# Patient Record
Sex: FEMALE | Race: BLACK OR AFRICAN AMERICAN | NOT HISPANIC OR LATINO | URBAN - METROPOLITAN AREA
[De-identification: names, ages, dates, MRNs, and addresses within clinical notes are randomized per-mention and may not be internally consistent; named-entity substitution may affect disease eponyms.]

---

## 2017-02-04 ENCOUNTER — INPATIENT (INPATIENT)
Facility: HOSPITAL | Age: 62
LOS: 0 days | Discharge: HOME | End: 2017-02-04
Attending: EMERGENCY MEDICINE | Admitting: FAMILY MEDICINE

## 2017-05-05 ENCOUNTER — OUTPATIENT (OUTPATIENT)
Dept: OUTPATIENT SERVICES | Facility: HOSPITAL | Age: 62
LOS: 1 days | Discharge: HOME | End: 2017-05-05

## 2017-05-10 ENCOUNTER — RECORD ABSTRACTING (OUTPATIENT)
Age: 62
End: 2017-05-10

## 2017-05-10 DIAGNOSIS — Z83.3 FAMILY HISTORY OF DIABETES MELLITUS: ICD-10-CM

## 2017-05-10 DIAGNOSIS — L98.0 PYOGENIC GRANULOMA: ICD-10-CM

## 2017-05-10 DIAGNOSIS — I10 ESSENTIAL (PRIMARY) HYPERTENSION: ICD-10-CM

## 2017-05-10 DIAGNOSIS — Z80.9 FAMILY HISTORY OF MALIGNANT NEOPLASM, UNSPECIFIED: ICD-10-CM

## 2017-05-10 DIAGNOSIS — E11.9 TYPE 2 DIABETES MELLITUS W/OUT COMPLICATIONS: ICD-10-CM

## 2017-05-10 DIAGNOSIS — Z78.9 OTHER SPECIFIED HEALTH STATUS: ICD-10-CM

## 2017-05-10 DIAGNOSIS — E78.00 PURE HYPERCHOLESTEROLEMIA, UNSPECIFIED: ICD-10-CM

## 2017-05-10 DIAGNOSIS — Z98.890 OTHER SPECIFIED POSTPROCEDURAL STATES: ICD-10-CM

## 2017-05-10 DIAGNOSIS — M77.9 ENTHESOPATHY, UNSPECIFIED: ICD-10-CM

## 2017-05-10 DIAGNOSIS — Z82.49 FAMILY HISTORY OF ISCHEMIC HEART DISEASE AND OTHER DISEASES OF THE CIRCULATORY SYSTEM: ICD-10-CM

## 2017-05-10 RX ORDER — METFORMIN HYDROCHLORIDE 1000 MG/1
1000 TABLET, FILM COATED ORAL
Refills: 0 | Status: ACTIVE | COMMUNITY

## 2017-05-10 RX ORDER — ATORVASTATIN CALCIUM 10 MG/1
10 TABLET, FILM COATED ORAL
Refills: 0 | Status: ACTIVE | COMMUNITY

## 2017-05-10 RX ORDER — HYDROCHLOROTHIAZIDE 25 MG/1
25 TABLET ORAL
Refills: 0 | Status: ACTIVE | COMMUNITY

## 2017-05-10 RX ORDER — LOSARTAN POTASSIUM AND HYDROCHLOROTHIAZIDE 12.5; 1 MG/1; MG/1
100-12.5 TABLET ORAL
Refills: 0 | Status: ACTIVE | COMMUNITY

## 2017-06-08 ENCOUNTER — APPOINTMENT (OUTPATIENT)
Dept: PLASTIC SURGERY | Facility: CLINIC | Age: 62
End: 2017-06-08

## 2017-06-08 VITALS — BODY MASS INDEX: 26.36 KG/M2 | HEIGHT: 66 IN | WEIGHT: 164 LBS

## 2017-06-08 DIAGNOSIS — Z87.39 PERSONAL HISTORY OF OTHER DISEASES OF THE MUSCULOSKELETAL SYSTEM AND CONNECTIVE TISSUE: ICD-10-CM

## 2017-06-08 RX ORDER — ATENOLOL 25 MG/1
25 TABLET ORAL
Refills: 0 | Status: DISCONTINUED | COMMUNITY
End: 2017-06-08

## 2017-06-08 RX ORDER — ASPIRIN 81 MG
81 TABLET, DELAYED RELEASE (ENTERIC COATED) ORAL
Refills: 0 | Status: DISCONTINUED | COMMUNITY
End: 2017-06-08

## 2017-06-08 RX ORDER — ATENOLOL 50 MG/1
50 TABLET ORAL DAILY
Refills: 0 | Status: ACTIVE | COMMUNITY

## 2017-06-28 DIAGNOSIS — M54.2 CERVICALGIA: ICD-10-CM

## 2017-07-24 DIAGNOSIS — E11.9 TYPE 2 DIABETES MELLITUS WITHOUT COMPLICATIONS: ICD-10-CM

## 2017-07-24 DIAGNOSIS — R07.9 CHEST PAIN, UNSPECIFIED: ICD-10-CM

## 2017-07-24 DIAGNOSIS — R07.89 OTHER CHEST PAIN: ICD-10-CM

## 2017-07-24 DIAGNOSIS — M54.2 CERVICALGIA: ICD-10-CM

## 2017-07-24 DIAGNOSIS — M25.512 PAIN IN LEFT SHOULDER: ICD-10-CM

## 2017-07-24 DIAGNOSIS — E78.5 HYPERLIPIDEMIA, UNSPECIFIED: ICD-10-CM

## 2017-07-24 DIAGNOSIS — I10 ESSENTIAL (PRIMARY) HYPERTENSION: ICD-10-CM

## 2017-07-29 ENCOUNTER — OUTPATIENT (OUTPATIENT)
Dept: OUTPATIENT SERVICES | Facility: HOSPITAL | Age: 62
LOS: 1 days | Discharge: HOME | End: 2017-07-29

## 2017-07-29 DIAGNOSIS — R79.89 OTHER SPECIFIED ABNORMAL FINDINGS OF BLOOD CHEMISTRY: ICD-10-CM

## 2017-07-29 DIAGNOSIS — M06.4 INFLAMMATORY POLYARTHROPATHY: ICD-10-CM

## 2017-07-29 DIAGNOSIS — R07.9 CHEST PAIN, UNSPECIFIED: ICD-10-CM

## 2017-07-29 DIAGNOSIS — D50.8 OTHER IRON DEFICIENCY ANEMIAS: ICD-10-CM

## 2017-07-29 DIAGNOSIS — E11.9 TYPE 2 DIABETES MELLITUS WITHOUT COMPLICATIONS: ICD-10-CM

## 2017-10-05 ENCOUNTER — OUTPATIENT (OUTPATIENT)
Dept: OUTPATIENT SERVICES | Facility: HOSPITAL | Age: 62
LOS: 1 days | Discharge: HOME | End: 2017-10-05

## 2017-10-05 DIAGNOSIS — D50.8 OTHER IRON DEFICIENCY ANEMIAS: ICD-10-CM

## 2017-10-05 DIAGNOSIS — E11.9 TYPE 2 DIABETES MELLITUS WITHOUT COMPLICATIONS: ICD-10-CM

## 2017-10-05 DIAGNOSIS — R07.9 CHEST PAIN, UNSPECIFIED: ICD-10-CM

## 2017-10-05 DIAGNOSIS — R79.89 OTHER SPECIFIED ABNORMAL FINDINGS OF BLOOD CHEMISTRY: ICD-10-CM

## 2017-12-22 ENCOUNTER — OUTPATIENT (OUTPATIENT)
Dept: OUTPATIENT SERVICES | Facility: HOSPITAL | Age: 62
LOS: 1 days | Discharge: HOME | End: 2017-12-22

## 2017-12-22 DIAGNOSIS — E11.9 TYPE 2 DIABETES MELLITUS WITHOUT COMPLICATIONS: ICD-10-CM

## 2017-12-22 DIAGNOSIS — R07.9 CHEST PAIN, UNSPECIFIED: ICD-10-CM

## 2017-12-22 DIAGNOSIS — D50.8 OTHER IRON DEFICIENCY ANEMIAS: ICD-10-CM

## 2018-03-07 ENCOUNTER — APPOINTMENT (OUTPATIENT)
Dept: PLASTIC SURGERY | Facility: CLINIC | Age: 63
End: 2018-03-07

## 2018-03-29 ENCOUNTER — OUTPATIENT (OUTPATIENT)
Dept: OUTPATIENT SERVICES | Facility: HOSPITAL | Age: 63
LOS: 1 days | Discharge: HOME | End: 2018-03-29

## 2018-03-29 DIAGNOSIS — M06.4 INFLAMMATORY POLYARTHROPATHY: ICD-10-CM

## 2018-06-12 ENCOUNTER — APPOINTMENT (OUTPATIENT)
Dept: PLASTIC SURGERY | Facility: CLINIC | Age: 63
End: 2018-06-12
Payer: MEDICAID

## 2018-06-12 PROCEDURE — 20550 NJX 1 TENDON SHEATH/LIGAMENT: CPT

## 2018-06-12 PROCEDURE — 99212 OFFICE O/P EST SF 10 MIN: CPT | Mod: 25

## 2018-06-12 RX ORDER — ASPIRIN 81 MG
81 TABLET, DELAYED RELEASE (ENTERIC COATED) ORAL
Refills: 0 | Status: ACTIVE | COMMUNITY

## 2018-06-15 ENCOUNTER — OUTPATIENT (OUTPATIENT)
Dept: OUTPATIENT SERVICES | Facility: HOSPITAL | Age: 63
LOS: 1 days | Discharge: HOME | End: 2018-06-15

## 2018-06-15 DIAGNOSIS — I10 ESSENTIAL (PRIMARY) HYPERTENSION: ICD-10-CM

## 2018-06-15 DIAGNOSIS — E78.2 MIXED HYPERLIPIDEMIA: ICD-10-CM

## 2018-06-29 ENCOUNTER — OUTPATIENT (OUTPATIENT)
Dept: OUTPATIENT SERVICES | Facility: HOSPITAL | Age: 63
LOS: 1 days | Discharge: HOME | End: 2018-06-29

## 2018-06-29 DIAGNOSIS — D64.9 ANEMIA, UNSPECIFIED: ICD-10-CM

## 2018-06-29 DIAGNOSIS — E11.9 TYPE 2 DIABETES MELLITUS WITHOUT COMPLICATIONS: ICD-10-CM

## 2018-08-10 ENCOUNTER — APPOINTMENT (OUTPATIENT)
Dept: PLASTIC SURGERY | Facility: CLINIC | Age: 63
End: 2018-08-10
Payer: MEDICARE

## 2018-08-10 PROCEDURE — 26055 INCISE FINGER TENDON SHEATH: CPT | Mod: F6

## 2018-08-17 ENCOUNTER — APPOINTMENT (OUTPATIENT)
Dept: PLASTIC SURGERY | Facility: CLINIC | Age: 63
End: 2018-08-17
Payer: MEDICARE

## 2018-08-17 PROCEDURE — 99024 POSTOP FOLLOW-UP VISIT: CPT

## 2018-08-24 ENCOUNTER — APPOINTMENT (OUTPATIENT)
Dept: PLASTIC SURGERY | Facility: CLINIC | Age: 63
End: 2018-08-24
Payer: MEDICARE

## 2018-08-24 PROCEDURE — 99024 POSTOP FOLLOW-UP VISIT: CPT

## 2018-08-29 ENCOUNTER — APPOINTMENT (OUTPATIENT)
Dept: PLASTIC SURGERY | Facility: CLINIC | Age: 63
End: 2018-08-29
Payer: MEDICARE

## 2018-08-29 PROCEDURE — 99024 POSTOP FOLLOW-UP VISIT: CPT

## 2018-08-29 RX ORDER — PREDNISONE 1 MG/1
1 TABLET ORAL
Qty: 120 | Refills: 0 | Status: COMPLETED | COMMUNITY
Start: 2018-04-12 | End: 2018-07-13

## 2018-08-29 RX ORDER — BLOOD SUGAR DIAGNOSTIC
STRIP MISCELLANEOUS
Qty: 100 | Refills: 0 | Status: ACTIVE | COMMUNITY
Start: 2018-05-21

## 2018-08-29 RX ORDER — ATORVASTATIN CALCIUM 20 MG/1
20 TABLET, FILM COATED ORAL
Qty: 30 | Refills: 0 | Status: ACTIVE | COMMUNITY
Start: 2018-02-28

## 2018-08-29 RX ORDER — BLOOD-GLUCOSE METER
W/DEVICE EACH MISCELLANEOUS
Qty: 1 | Refills: 0 | Status: ACTIVE | COMMUNITY
Start: 2018-05-21

## 2018-08-29 RX ORDER — LOSARTAN POTASSIUM 100 MG/1
100 TABLET, FILM COATED ORAL
Qty: 90 | Refills: 0 | Status: ACTIVE | COMMUNITY
Start: 2018-07-02

## 2018-08-29 RX ORDER — AMLODIPINE BESYLATE 5 MG/1
5 TABLET ORAL
Qty: 30 | Refills: 0 | Status: ACTIVE | COMMUNITY
Start: 2018-02-28

## 2018-09-07 ENCOUNTER — APPOINTMENT (OUTPATIENT)
Dept: PLASTIC SURGERY | Facility: CLINIC | Age: 63
End: 2018-09-07

## 2018-09-18 ENCOUNTER — OUTPATIENT (OUTPATIENT)
Dept: OUTPATIENT SERVICES | Facility: HOSPITAL | Age: 63
LOS: 1 days | Discharge: HOME | End: 2018-09-18

## 2018-09-18 DIAGNOSIS — M65.331 TRIGGER FINGER, RIGHT MIDDLE FINGER: ICD-10-CM

## 2018-09-27 ENCOUNTER — APPOINTMENT (OUTPATIENT)
Dept: PLASTIC SURGERY | Facility: CLINIC | Age: 63
End: 2018-09-27
Payer: MEDICARE

## 2018-09-27 PROCEDURE — 99024 POSTOP FOLLOW-UP VISIT: CPT

## 2018-09-28 ENCOUNTER — OUTPATIENT (OUTPATIENT)
Dept: OUTPATIENT SERVICES | Facility: HOSPITAL | Age: 63
LOS: 1 days | Discharge: HOME | End: 2018-09-28

## 2018-09-28 DIAGNOSIS — K76.9 LIVER DISEASE, UNSPECIFIED: ICD-10-CM

## 2018-09-28 DIAGNOSIS — M06.4 INFLAMMATORY POLYARTHROPATHY: ICD-10-CM

## 2018-09-28 DIAGNOSIS — E78.2 MIXED HYPERLIPIDEMIA: ICD-10-CM

## 2018-09-28 DIAGNOSIS — M35.00 SJOGREN SYNDROME, UNSPECIFIED: ICD-10-CM

## 2018-09-28 DIAGNOSIS — R05 COUGH: ICD-10-CM

## 2018-09-28 DIAGNOSIS — R53.81 OTHER MALAISE: ICD-10-CM

## 2018-09-28 DIAGNOSIS — D50.8 OTHER IRON DEFICIENCY ANEMIAS: ICD-10-CM

## 2018-11-13 ENCOUNTER — OUTPATIENT (OUTPATIENT)
Dept: OUTPATIENT SERVICES | Facility: HOSPITAL | Age: 63
LOS: 1 days | Discharge: HOME | End: 2018-11-13

## 2018-11-13 DIAGNOSIS — E78.2 MIXED HYPERLIPIDEMIA: ICD-10-CM

## 2018-11-13 DIAGNOSIS — D50.8 OTHER IRON DEFICIENCY ANEMIAS: ICD-10-CM

## 2018-11-13 DIAGNOSIS — M06.4 INFLAMMATORY POLYARTHROPATHY: ICD-10-CM

## 2018-12-12 ENCOUNTER — OUTPATIENT (OUTPATIENT)
Dept: OUTPATIENT SERVICES | Facility: HOSPITAL | Age: 63
LOS: 1 days | Discharge: HOME | End: 2018-12-12

## 2018-12-12 DIAGNOSIS — D50.8 OTHER IRON DEFICIENCY ANEMIAS: ICD-10-CM

## 2018-12-12 DIAGNOSIS — M06.4 INFLAMMATORY POLYARTHROPATHY: ICD-10-CM

## 2018-12-12 DIAGNOSIS — E78.2 MIXED HYPERLIPIDEMIA: ICD-10-CM

## 2018-12-18 ENCOUNTER — APPOINTMENT (OUTPATIENT)
Dept: PLASTIC SURGERY | Facility: CLINIC | Age: 63
End: 2018-12-18
Payer: MEDICARE

## 2018-12-18 PROCEDURE — 99212 OFFICE O/P EST SF 10 MIN: CPT

## 2018-12-31 ENCOUNTER — OUTPATIENT (OUTPATIENT)
Dept: OUTPATIENT SERVICES | Facility: HOSPITAL | Age: 63
LOS: 1 days | Discharge: HOME | End: 2018-12-31

## 2018-12-31 DIAGNOSIS — D50.8 OTHER IRON DEFICIENCY ANEMIAS: ICD-10-CM

## 2018-12-31 DIAGNOSIS — M06.4 INFLAMMATORY POLYARTHROPATHY: ICD-10-CM

## 2018-12-31 DIAGNOSIS — E78.2 MIXED HYPERLIPIDEMIA: ICD-10-CM

## 2019-01-21 ENCOUNTER — OUTPATIENT (OUTPATIENT)
Dept: OUTPATIENT SERVICES | Facility: HOSPITAL | Age: 64
LOS: 1 days | Discharge: HOME | End: 2019-01-21

## 2019-01-21 DIAGNOSIS — N39.0 URINARY TRACT INFECTION, SITE NOT SPECIFIED: ICD-10-CM

## 2019-02-14 ENCOUNTER — OUTPATIENT (OUTPATIENT)
Dept: OUTPATIENT SERVICES | Facility: HOSPITAL | Age: 64
LOS: 1 days | Discharge: HOME | End: 2019-02-14

## 2019-02-14 DIAGNOSIS — D50.8 OTHER IRON DEFICIENCY ANEMIAS: ICD-10-CM

## 2019-02-14 DIAGNOSIS — M06.4 INFLAMMATORY POLYARTHROPATHY: ICD-10-CM

## 2019-02-14 DIAGNOSIS — E78.2 MIXED HYPERLIPIDEMIA: ICD-10-CM

## 2019-03-23 ENCOUNTER — OUTPATIENT (OUTPATIENT)
Dept: OUTPATIENT SERVICES | Facility: HOSPITAL | Age: 64
LOS: 1 days | Discharge: HOME | End: 2019-03-23

## 2019-03-23 DIAGNOSIS — D60.8 OTHER ACQUIRED PURE RED CELL APLASIAS: ICD-10-CM

## 2019-03-23 DIAGNOSIS — M06.4 INFLAMMATORY POLYARTHROPATHY: ICD-10-CM

## 2019-03-23 DIAGNOSIS — E78.2 MIXED HYPERLIPIDEMIA: ICD-10-CM

## 2019-03-23 DIAGNOSIS — E55.9 VITAMIN D DEFICIENCY, UNSPECIFIED: ICD-10-CM

## 2019-03-23 DIAGNOSIS — E11.9 TYPE 2 DIABETES MELLITUS WITHOUT COMPLICATIONS: ICD-10-CM

## 2019-03-23 DIAGNOSIS — I10 ESSENTIAL (PRIMARY) HYPERTENSION: ICD-10-CM

## 2019-05-09 ENCOUNTER — OUTPATIENT (OUTPATIENT)
Dept: OUTPATIENT SERVICES | Facility: HOSPITAL | Age: 64
LOS: 1 days | Discharge: HOME | End: 2019-05-09

## 2019-05-09 DIAGNOSIS — M06.4 INFLAMMATORY POLYARTHROPATHY: ICD-10-CM

## 2019-05-09 DIAGNOSIS — E78.2 MIXED HYPERLIPIDEMIA: ICD-10-CM

## 2019-05-09 DIAGNOSIS — D60.8 OTHER ACQUIRED PURE RED CELL APLASIAS: ICD-10-CM

## 2019-06-05 ENCOUNTER — OUTPATIENT (OUTPATIENT)
Dept: OUTPATIENT SERVICES | Facility: HOSPITAL | Age: 64
LOS: 1 days | Discharge: HOME | End: 2019-06-05

## 2019-06-05 DIAGNOSIS — E78.2 MIXED HYPERLIPIDEMIA: ICD-10-CM

## 2019-06-05 DIAGNOSIS — I10 ESSENTIAL (PRIMARY) HYPERTENSION: ICD-10-CM

## 2019-06-05 DIAGNOSIS — E11.9 TYPE 2 DIABETES MELLITUS WITHOUT COMPLICATIONS: ICD-10-CM

## 2019-06-05 DIAGNOSIS — E55.9 VITAMIN D DEFICIENCY, UNSPECIFIED: ICD-10-CM

## 2019-06-10 ENCOUNTER — OUTPATIENT (OUTPATIENT)
Dept: OUTPATIENT SERVICES | Facility: HOSPITAL | Age: 64
LOS: 1 days | Discharge: HOME | End: 2019-06-10

## 2019-06-10 DIAGNOSIS — D50.9 IRON DEFICIENCY ANEMIA, UNSPECIFIED: ICD-10-CM

## 2019-06-10 DIAGNOSIS — M35.3 POLYMYALGIA RHEUMATICA: ICD-10-CM

## 2019-06-17 ENCOUNTER — EMERGENCY (EMERGENCY)
Facility: HOSPITAL | Age: 64
LOS: 0 days | Discharge: HOME | End: 2019-06-17
Attending: EMERGENCY MEDICINE | Admitting: EMERGENCY MEDICINE
Payer: MEDICARE

## 2019-06-17 VITALS
HEART RATE: 73 BPM | TEMPERATURE: 99 F | RESPIRATION RATE: 16 BRPM | SYSTOLIC BLOOD PRESSURE: 183 MMHG | OXYGEN SATURATION: 100 % | DIASTOLIC BLOOD PRESSURE: 87 MMHG

## 2019-06-17 DIAGNOSIS — M54.5 LOW BACK PAIN: ICD-10-CM

## 2019-06-17 DIAGNOSIS — M54.9 DORSALGIA, UNSPECIFIED: ICD-10-CM

## 2019-06-17 DIAGNOSIS — G89.29 OTHER CHRONIC PAIN: ICD-10-CM

## 2019-06-17 PROCEDURE — 99282 EMERGENCY DEPT VISIT SF MDM: CPT

## 2019-06-17 NOTE — ED PROVIDER NOTE - OBJECTIVE STATEMENT
63F nosigPMH p/w right sided lower back pain. Pain present for 1 day, denies b/l weakness or numbness, unilateral weakness or numbness. Back pain does not radiate. Denies fever, chills, saddle anesthesia, urinary or fecal incontinence, abdominal pain, syncope. Pt has had similar pain the past.

## 2019-06-17 NOTE — ED PROVIDER NOTE - PHYSICAL EXAMINATION
Constitutional: Well developed, well nourished. NAD. Good general hygiene  Cardiovascular: Regular rhythm. Regular rate. Normal S1 and S2. No murmurs. 2+ pulses in all extremities.   Pulmonary: Normal respiratory rate and effort. Lungs clear to auscultation bilaterally. No wheezing, rales, or rhonchi. Bilateral, equal lung expansion.   Abdominal: Soft. Nondistended. Nontender. No rebound or guarding.   Back: Right sided point tenderness overlying right SI joint. No midline back tenderness.  Extremities. Pelvis stable. No lower extremity edema. Symmetric calves.  Skin: No rashes.   Neuro: AAOx3. 5/5 strength lower extremities. Sensation equal, intact in b/l LE's. Saddle area sensation intact.

## 2019-06-17 NOTE — ED PROVIDER NOTE - ATTENDING CONTRIBUTION TO CARE
Pt is a 62yo female with 1d of R lower back pain.  Non-radiating.  No trauma.  Recurrent familiar pain.  tried a brown paper soaked in vinegar.    Exam: no spinal tenderness, R SI joint pain, NAD, noraml gait  Imp: SI arthritis  Plan: dc home

## 2019-06-20 ENCOUNTER — OUTPATIENT (OUTPATIENT)
Dept: OUTPATIENT SERVICES | Facility: HOSPITAL | Age: 64
LOS: 1 days | Discharge: HOME | End: 2019-06-20

## 2019-06-20 DIAGNOSIS — E78.2 MIXED HYPERLIPIDEMIA: ICD-10-CM

## 2019-06-20 DIAGNOSIS — D50.8 OTHER IRON DEFICIENCY ANEMIAS: ICD-10-CM

## 2019-06-20 DIAGNOSIS — M06.4 INFLAMMATORY POLYARTHROPATHY: ICD-10-CM

## 2019-08-29 ENCOUNTER — OUTPATIENT (OUTPATIENT)
Dept: OUTPATIENT SERVICES | Facility: HOSPITAL | Age: 64
LOS: 1 days | Discharge: HOME | End: 2019-08-29

## 2019-08-29 DIAGNOSIS — D60.8 OTHER ACQUIRED PURE RED CELL APLASIAS: ICD-10-CM

## 2019-08-29 DIAGNOSIS — M06.4 INFLAMMATORY POLYARTHROPATHY: ICD-10-CM

## 2019-08-29 DIAGNOSIS — E78.2 MIXED HYPERLIPIDEMIA: ICD-10-CM

## 2019-08-29 DIAGNOSIS — M32.10 SYSTEMIC LUPUS ERYTHEMATOSUS, ORGAN OR SYSTEM INVOLVEMENT UNSPECIFIED: ICD-10-CM

## 2019-09-17 ENCOUNTER — OUTPATIENT (OUTPATIENT)
Dept: OUTPATIENT SERVICES | Facility: HOSPITAL | Age: 64
LOS: 1 days | Discharge: HOME | End: 2019-09-17

## 2019-09-17 DIAGNOSIS — E11.65 TYPE 2 DIABETES MELLITUS WITH HYPERGLYCEMIA: ICD-10-CM

## 2019-09-17 DIAGNOSIS — I10 ESSENTIAL (PRIMARY) HYPERTENSION: ICD-10-CM

## 2019-09-17 DIAGNOSIS — E78.2 MIXED HYPERLIPIDEMIA: ICD-10-CM

## 2019-10-02 ENCOUNTER — OUTPATIENT (OUTPATIENT)
Dept: OUTPATIENT SERVICES | Facility: HOSPITAL | Age: 64
LOS: 1 days | Discharge: HOME | End: 2019-10-02

## 2019-10-02 DIAGNOSIS — M32.10 SYSTEMIC LUPUS ERYTHEMATOSUS, ORGAN OR SYSTEM INVOLVEMENT UNSPECIFIED: ICD-10-CM

## 2019-10-02 DIAGNOSIS — M06.4 INFLAMMATORY POLYARTHROPATHY: ICD-10-CM

## 2019-10-02 DIAGNOSIS — D60.8 OTHER ACQUIRED PURE RED CELL APLASIAS: ICD-10-CM

## 2019-10-02 DIAGNOSIS — M35.00 SJOGREN SYNDROME, UNSPECIFIED: ICD-10-CM

## 2019-11-20 ENCOUNTER — OUTPATIENT (OUTPATIENT)
Dept: OUTPATIENT SERVICES | Facility: HOSPITAL | Age: 64
LOS: 1 days | Discharge: HOME | End: 2019-11-20

## 2019-11-20 DIAGNOSIS — E78.2 MIXED HYPERLIPIDEMIA: ICD-10-CM

## 2019-11-20 DIAGNOSIS — M32.10 SYSTEMIC LUPUS ERYTHEMATOSUS, ORGAN OR SYSTEM INVOLVEMENT UNSPECIFIED: ICD-10-CM

## 2019-11-20 DIAGNOSIS — N19 UNSPECIFIED KIDNEY FAILURE: ICD-10-CM

## 2019-11-20 DIAGNOSIS — M06.4 INFLAMMATORY POLYARTHROPATHY: ICD-10-CM

## 2019-11-20 DIAGNOSIS — M32.9 SYSTEMIC LUPUS ERYTHEMATOSUS, UNSPECIFIED: ICD-10-CM

## 2019-11-20 DIAGNOSIS — D60.8 OTHER ACQUIRED PURE RED CELL APLASIAS: ICD-10-CM

## 2020-01-15 ENCOUNTER — EMERGENCY (EMERGENCY)
Facility: HOSPITAL | Age: 65
LOS: 0 days | Discharge: HOME | End: 2020-01-15
Admitting: EMERGENCY MEDICINE
Payer: MEDICARE

## 2020-01-15 VITALS
TEMPERATURE: 99 F | DIASTOLIC BLOOD PRESSURE: 82 MMHG | RESPIRATION RATE: 18 BRPM | OXYGEN SATURATION: 98 % | SYSTOLIC BLOOD PRESSURE: 133 MMHG | HEART RATE: 89 BPM

## 2020-01-15 DIAGNOSIS — M25.561 PAIN IN RIGHT KNEE: ICD-10-CM

## 2020-01-15 DIAGNOSIS — R05 COUGH: ICD-10-CM

## 2020-01-15 PROCEDURE — 99283 EMERGENCY DEPT VISIT LOW MDM: CPT

## 2020-01-15 PROCEDURE — 71046 X-RAY EXAM CHEST 2 VIEWS: CPT | Mod: 26

## 2020-01-15 PROCEDURE — 73562 X-RAY EXAM OF KNEE 3: CPT | Mod: 26,RT

## 2020-01-15 RX ORDER — ACETAMINOPHEN 500 MG
975 TABLET ORAL ONCE
Refills: 0 | Status: COMPLETED | OUTPATIENT
Start: 2020-01-15 | End: 2020-01-15

## 2020-01-15 RX ORDER — AZITHROMYCIN 500 MG/1
1 TABLET, FILM COATED ORAL
Qty: 1 | Refills: 0
Start: 2020-01-15 | End: 2020-01-19

## 2020-01-15 RX ADMIN — Medication 975 MILLIGRAM(S): at 10:51

## 2020-01-15 NOTE — ED PROVIDER NOTE - NSFOLLOWUPINSTRUCTIONS_ED_ALL_ED_FT
Cough    Coughing is a reflex that clears your throat and your airways. Coughing helps to heal and protect your lungs. It is normal to cough occasionally, but a cough that happens with other symptoms or lasts a long time may be a sign of a condition that needs treatment. Coughing may be caused by infections, asthma or COPD, smoking, postnasal drip, gastroesophageal reflux, as well as other medical conditions. Take medicines only as instructed by your health care provider. Avoid environments or triggers that causes you to cough at work or at home.    SEEK IMMEDIATE MEDICAL CARE IF YOU HAVE ANY OF THE FOLLOWING SYMPTOMS: coughing up blood, shortness of breath, rapid heart rate, chest pain, unexplained weight loss or night sweats.    Knee Pain, Adult    Knee pain in adults is common. It can be caused by many things, including:    Arthritis.  A fluid-filled sac (cyst) or growth in your knee.  An infection in your knee.  An injury that will not heal.  Damage, swelling, or irritation of the tissues that support your knee.    Knee pain is usually not a sign of a serious problem. The pain may go away on its own with time and rest. If it does not, a health care provider may order tests to find the cause of the pain. These may include:    Imaging tests, such as an X-ray, MRI, or ultrasound.  Joint aspiration. In this test, fluid is removed from the knee.  Arthroscopy. In this test, a lighted tube is inserted into knee and an image is projected onto a TV screen.  A biopsy. In this test, a sample of tissue is removed from the body and studied under a microscope.    Follow these instructions at home:  Pay attention to any changes in your symptoms. Take these actions to relieve your pain.    Activity     Rest your knee.  Do not do things that cause pain or make pain worse.  Avoid high-impact activities or exercises, such as running, jumping rope, or doing jumping jacks.  General instructions     Take over-the-counter and prescription medicines only as told by your health care provider.  Raise (elevate) your knee above the level of your heart when you are sitting or lying down.  Sleep with a pillow under your knee.  If directed, apply ice to the knee:    Put ice in a plastic bag.  Place a towel between your skin and the bag.  Leave the ice on for 20 minutes, 2–3 times a day.    Ask your health care provider if you should wear an elastic knee support.  Lose weight if you are overweight. Extra weight can put pressure on your knee.  Do not use any products that contain nicotine or tobacco, such as cigarettes and e-cigarettes. Smoking may slow the healing of any bone and joint problems that you may have. If you need help quitting, ask your health care provider.  Contact a health care provider if:  Your knee pain continues, changes, or gets worse.  You have a fever along with knee pain.  Your knee elvi or locks up.  Your knee swells, and the swelling becomes worse.  Get help right away if:  Your knee feels warm to the touch.  You cannot move your knee.  You have severe pain in your knee.  You have chest pain.  You have trouble breathing.  Summary  Knee pain in adults is common. It can be caused by many things, including, arthritis, infection, cysts, or injury.  Knee pain is usually not a sign of a serious problem, but if it does not go away, a health care provider may perform tests to know the cause of the pain.  Pay attention to any changes in your symptoms. Relieve your pain with rest, medicines, light activity, and use of ice.  Get help if your pain continues or becomes very severe, or if your knee elvi or locks up, or if you have chest pain or trouble breathing.  This information is not intended to replace advice given to you by your health care provider. Make sure you discuss any questions you have with your health care provider.

## 2020-01-15 NOTE — ED PROVIDER NOTE - PROVIDER TOKENS
FREE:[LAST:[your PMD 2 days],PHONE:[(   )    -],FAX:[(   )    -]],PROVIDER:[TOKEN:[[id number]],FOLLOWUP:[1-3 Days]]

## 2020-01-15 NOTE — ED ADULT NURSE NOTE - CHIEF COMPLAINT QUOTE
Patient complains of right knee pain and cough x 5 days, denies injury to knee, denies fever at home

## 2020-01-15 NOTE — ED PROVIDER NOTE - OBJECTIVE STATEMENT
63 yo F hx of DM, HTN, polymyalgia rheumatica c/o cough with yellow/green sputum x5 days. No fevers, chills, CP, or SOB. Patient  also c/o right knee pain since yesterday. No injury.

## 2020-01-15 NOTE — ED PROVIDER NOTE - PATIENT PORTAL LINK FT
You can access the FollowMyHealth Patient Portal offered by [facility] by registering at the following website: [email or web address]. By joining [email or web address]’s FollowMyHealth portal, you will also be able to view your health information using other applications (apps) compatible with our system.

## 2020-01-15 NOTE — ED PROVIDER NOTE - PHYSICAL EXAMINATION
Gen: Alert, NAD, well appearing  Head: NC, AT, PERRL, EOMI, normal lids/conjunctiva  ENT: normal hearing, patent oropharynx without erythema/exudate  Neck: +supple, no tenderness/meningismus,  Pulm: Bilateral BS, normal resp effort, no wheeze/stridor/retractions  CV: RRR, no murmer  Abd: soft, NT/ND  Mskel: Right knee:  +tender to palpation lateral knee. No swelling, ecchymosis, erythema,  or warmth to knee. +pain with ROM. n/v intact. Patient ambulatory in ED. No edema/erythema/cyanosis  Skin: no rash, warm/dry  Neuro: AAOx3, no sensory/motor deficits

## 2020-01-15 NOTE — ED ADULT TRIAGE NOTE - CHIEF COMPLAINT QUOTE
Patient complains of right knee pain and cough x 5 days Patient complains of right knee pain and cough x 5 days, denies injury to knee, denies fever at home

## 2020-01-15 NOTE — ED ADULT NURSE NOTE - NSIMPLEMENTINTERV_GEN_ALL_ED
Implemented All Universal Safety Interventions:  [place] to call system. Call bell, personal items and telephone within reach. Instruct patient to call for assistance. Room bathroom lighting operational. Non-slip footwear when patient is off stretcher. Physically safe environment: no spills, clutter or unnecessary equipment. Stretcher in lowest position, wheels locked, appropriate side rails in place.

## 2020-01-15 NOTE — ED PROVIDER NOTE - CARE PROVIDER_API CALL
your PMD 2 days,   Phone: (   )    -  Fax: (   )    -  Follow Up Time:     Julian Liu (MD)  Orthopaedic Surgery  [address] [place] [address]  Phone: [phone]  Fax: [phone]  Follow Up Time: 1-3 Days

## 2020-01-15 NOTE — ED ADULT NURSE NOTE - OBJECTIVE STATEMENT
Pt presents with c/o right knee pain and back pain. Denies recent trauma to areas. No obvious trauma or deformities noted. Alert and oriented x3.

## 2020-01-15 NOTE — ED PROVIDER NOTE - NS ED ROS FT
Review of Systems    Constitutional: (-) fever, (-) chills  Eyes/ENT: (-) blurry vision, (-) epistaxis, (-) sore throat  Cardiovascular: (-) chest pain, (-) syncope  Respiratory: (+) cough, (-) shortness of breath  Gastrointestinal: (-) pain, (-) nausea, (-) vomiting, (-) diarrhea  Musculoskeletal: (-) neck pain, (-) back pain, (+) right knee pain  Integumentary: (-) rash, (-) edema  Neurological: (-) headache, (-) altered mental status

## 2020-09-22 ENCOUNTER — APPOINTMENT (OUTPATIENT)
Dept: PLASTIC SURGERY | Facility: CLINIC | Age: 65
End: 2020-09-22
Payer: MEDICARE

## 2020-09-22 DIAGNOSIS — M79.643 PAIN IN UNSPECIFIED HAND: ICD-10-CM

## 2020-09-22 PROCEDURE — 99212 OFFICE O/P EST SF 10 MIN: CPT

## 2020-09-22 NOTE — ASSESSMENT
[FreeTextEntry1] : 64 yo F s/p release of right 2nd and 3rd trigger finger 8/2018 now with new 4th TF.\par \par - recommend Kenalog injection \par \par injected 5mg kenalog into right 4th digit--tolerated fine\par \par B/L hand xrays\par \par f/u in 6 wks\par \par tolerated injection well

## 2020-09-22 NOTE — HISTORY OF PRESENT ILLNESS
[FreeTextEntry1] : 66 yo F with h/o left index and middle trigger finger release doing well who presents with recurrent triggering of the right index and middle finger. Patient is s/p Kenalog injection into right index x1 in 2016 and 3rd TF in 8/2016 and 6/2017 with recurrent triggering. \par \par Patient is now 4 months s/p release of right 2nd and 3rd trigger finger. Presents for follow up.\par \par Interval hx (9/22/20). Patient presents today for f/u 2 yrs s/p right index and middle TFR c/o right hand stiffness and pain of the 4th digit now for the past few months causing limited ROM. Denies any hand trauma.

## 2020-09-22 NOTE — PHYSICAL EXAM
[de-identified] : NAD [de-identified] : Right volar hand incisions over 2nd and 3rd A1-pulley well-healed, tenderness over 4th A1-pulley, limited ROM with slight hand swelling

## 2020-11-03 ENCOUNTER — APPOINTMENT (OUTPATIENT)
Dept: PLASTIC SURGERY | Facility: CLINIC | Age: 65
End: 2020-11-03
Payer: MEDICARE

## 2020-11-03 DIAGNOSIS — M65.331 TRIGGER FINGER, RIGHT MIDDLE FINGER: ICD-10-CM

## 2020-11-03 PROCEDURE — 99072 ADDL SUPL MATRL&STAF TM PHE: CPT

## 2020-11-03 PROCEDURE — 20550 NJX 1 TENDON SHEATH/LIGAMENT: CPT

## 2020-11-03 PROCEDURE — 99212 OFFICE O/P EST SF 10 MIN: CPT | Mod: 25

## 2020-11-03 NOTE — HISTORY OF PRESENT ILLNESS
[FreeTextEntry1] : 66 yo F with h/o left index and middle trigger finger release doing well who presents with recurrent triggering of the right index and middle finger. Patient is s/p Kenalog injection into right index x1 in 2016 and 3rd TF in 8/2016 and 6/2017 with recurrent triggering. \par \par Patient is now 4 months s/p release of right 2nd and 3rd trigger finger. Presents for follow up.\par \par Interval hx (9/22/20). Patient presents today for f/u 2 yrs s/p right index and middle TFR c/o right hand stiffness and pain of the 4th digit now for the past few months causing limited ROM. Denies any hand trauma. \par \par Interval hx (11/3/20). Patient presents today for f/u to discuss X-ray findings.

## 2020-11-03 NOTE — ASSESSMENT
[FreeTextEntry1] : 66 yo F s/p release of right 2nd and 3rd trigger finger 8/2018 now with new 4th TF s/p 5mg kenalog into right 4th digit. \par \par - hand x-ray discussed, unremarkable except for mild right BJA\par \par as above\par no more triggering right fourth finger s/p injection\par has mild to moderate right BJA\par \par suggested and injected 5mg kenalog\par barrie well\par \par Due to COVID 19, pre-visit patient instructions were explained to the patient and their symptoms were checked upon arrival.  \par Masks were used by the health care providers and staff and the examination room was cleaned after the patient visit was completed.\par \par \par EMG to assess mild right CTS (intermittently numb right index)

## 2020-11-03 NOTE — PHYSICAL EXAM
[de-identified] : NAD [de-identified] : Right volar hand incisions over 2nd and 3rd A1-pulley well-healed, tenderness over 4th A1-pulley, limited ROM with slight hand swelling

## 2020-12-15 ENCOUNTER — APPOINTMENT (OUTPATIENT)
Dept: PLASTIC SURGERY | Facility: CLINIC | Age: 65
End: 2020-12-15
Payer: MEDICARE

## 2020-12-15 PROCEDURE — 99212 OFFICE O/P EST SF 10 MIN: CPT

## 2020-12-15 PROCEDURE — 99072 ADDL SUPL MATRL&STAF TM PHE: CPT

## 2020-12-15 NOTE — ASSESSMENT
[FreeTextEntry1] : EMG reviewed--severe B/L CTS R>L\par \par suggested CTR\par \par Regarding the hand surgery, we discussed the risk of bleeding, infection, need for additional unplanned surgery, need for postoperative hand occupational therapy, possible lack of improvement and diminished hand function.  We discussed prolonged recovery.  All questions were answered and all risks were well understood by the patient.\par \par Regarding the procedure, we discussed scarring, poor wound healing, bleeding, infection, need for additional surgery, and dissatisfaction with the outcome.  Also discussed possibility of keloid and/or hypertrophic scar formation as well as recurrence.  All questions were answered and risks understood.\par \par Due to COVID 19, pre-visit patient instructions were explained to the patient and their symptoms were checked upon arrival.  \par Masks were used by the health care providers and staff and the examination room was cleaned after the patient visit was completed.\par \par right side more symptomatic--do right side first

## 2021-02-09 ENCOUNTER — TRANSCRIPTION ENCOUNTER (OUTPATIENT)
Age: 66
End: 2021-02-09

## 2021-02-10 ENCOUNTER — TRANSCRIPTION ENCOUNTER (OUTPATIENT)
Age: 66
End: 2021-02-10

## 2021-02-28 ENCOUNTER — INPATIENT (INPATIENT)
Facility: HOSPITAL | Age: 66
LOS: 2 days | Discharge: HOME | End: 2021-03-03
Attending: FAMILY MEDICINE | Admitting: FAMILY MEDICINE
Payer: MEDICARE

## 2021-02-28 VITALS
RESPIRATION RATE: 18 BRPM | TEMPERATURE: 100 F | SYSTOLIC BLOOD PRESSURE: 139 MMHG | DIASTOLIC BLOOD PRESSURE: 83 MMHG | HEART RATE: 96 BPM | OXYGEN SATURATION: 100 %

## 2021-02-28 LAB
ALBUMIN SERPL ELPH-MCNC: 4.3 G/DL — SIGNIFICANT CHANGE UP (ref 3.5–5.2)
ALP SERPL-CCNC: 121 U/L — HIGH (ref 30–115)
ALT FLD-CCNC: 67 U/L — HIGH (ref 0–41)
ANION GAP SERPL CALC-SCNC: 11 MMOL/L — SIGNIFICANT CHANGE UP (ref 7–14)
APPEARANCE UR: CLEAR — SIGNIFICANT CHANGE UP
AST SERPL-CCNC: 51 U/L — HIGH (ref 0–41)
BACTERIA # UR AUTO: NEGATIVE — SIGNIFICANT CHANGE UP
BASOPHILS # BLD AUTO: 0.01 K/UL — SIGNIFICANT CHANGE UP (ref 0–0.2)
BASOPHILS NFR BLD AUTO: 0.2 % — SIGNIFICANT CHANGE UP (ref 0–1)
BILIRUB SERPL-MCNC: 0.3 MG/DL — SIGNIFICANT CHANGE UP (ref 0.2–1.2)
BILIRUB UR-MCNC: NEGATIVE — SIGNIFICANT CHANGE UP
BUN SERPL-MCNC: 20 MG/DL — SIGNIFICANT CHANGE UP (ref 10–20)
CALCIUM SERPL-MCNC: 9.8 MG/DL — SIGNIFICANT CHANGE UP (ref 8.5–10.1)
CHLORIDE SERPL-SCNC: 105 MMOL/L — SIGNIFICANT CHANGE UP (ref 98–110)
CO2 SERPL-SCNC: 27 MMOL/L — SIGNIFICANT CHANGE UP (ref 17–32)
COLOR SPEC: SIGNIFICANT CHANGE UP
CREAT SERPL-MCNC: 1.1 MG/DL — SIGNIFICANT CHANGE UP (ref 0.7–1.5)
DIFF PNL FLD: ABNORMAL
EOSINOPHIL # BLD AUTO: 0.07 K/UL — SIGNIFICANT CHANGE UP (ref 0–0.7)
EOSINOPHIL NFR BLD AUTO: 1.4 % — SIGNIFICANT CHANGE UP (ref 0–8)
EPI CELLS # UR: 2 /HPF — SIGNIFICANT CHANGE UP (ref 0–5)
GLUCOSE SERPL-MCNC: 79 MG/DL — SIGNIFICANT CHANGE UP (ref 70–99)
GLUCOSE UR QL: NEGATIVE — SIGNIFICANT CHANGE UP
HCT VFR BLD CALC: 33.2 % — LOW (ref 37–47)
HGB BLD-MCNC: 11 G/DL — LOW (ref 12–16)
HYALINE CASTS # UR AUTO: 1 /LPF — SIGNIFICANT CHANGE UP (ref 0–7)
IMM GRANULOCYTES NFR BLD AUTO: 0.4 % — HIGH (ref 0.1–0.3)
KETONES UR-MCNC: NEGATIVE — SIGNIFICANT CHANGE UP
LACTATE SERPL-SCNC: 1.5 MMOL/L — SIGNIFICANT CHANGE UP (ref 0.7–2)
LEUKOCYTE ESTERASE UR-ACNC: ABNORMAL
LIDOCAIN IGE QN: 49 U/L — SIGNIFICANT CHANGE UP (ref 7–60)
LYMPHOCYTES # BLD AUTO: 1.43 K/UL — SIGNIFICANT CHANGE UP (ref 1.2–3.4)
LYMPHOCYTES # BLD AUTO: 28.6 % — SIGNIFICANT CHANGE UP (ref 20.5–51.1)
MCHC RBC-ENTMCNC: 33.1 G/DL — SIGNIFICANT CHANGE UP (ref 32–37)
MCHC RBC-ENTMCNC: 33.1 PG — HIGH (ref 27–31)
MCV RBC AUTO: 100 FL — HIGH (ref 81–99)
MONOCYTES # BLD AUTO: 0.5 K/UL — SIGNIFICANT CHANGE UP (ref 0.1–0.6)
MONOCYTES NFR BLD AUTO: 10 % — HIGH (ref 1.7–9.3)
NEUTROPHILS # BLD AUTO: 2.97 K/UL — SIGNIFICANT CHANGE UP (ref 1.4–6.5)
NEUTROPHILS NFR BLD AUTO: 59.4 % — SIGNIFICANT CHANGE UP (ref 42.2–75.2)
NITRITE UR-MCNC: NEGATIVE — SIGNIFICANT CHANGE UP
NRBC # BLD: 0 /100 WBCS — SIGNIFICANT CHANGE UP (ref 0–0)
PH UR: 6 — SIGNIFICANT CHANGE UP (ref 5–8)
PLATELET # BLD AUTO: 380 K/UL — SIGNIFICANT CHANGE UP (ref 130–400)
POTASSIUM SERPL-MCNC: 4.6 MMOL/L — SIGNIFICANT CHANGE UP (ref 3.5–5)
POTASSIUM SERPL-SCNC: 4.6 MMOL/L — SIGNIFICANT CHANGE UP (ref 3.5–5)
PROT SERPL-MCNC: 7.5 G/DL — SIGNIFICANT CHANGE UP (ref 6–8)
PROT UR-MCNC: ABNORMAL
RBC # BLD: 3.32 M/UL — LOW (ref 4.2–5.4)
RBC # FLD: 13.9 % — SIGNIFICANT CHANGE UP (ref 11.5–14.5)
RBC CASTS # UR COMP ASSIST: 4 /HPF — SIGNIFICANT CHANGE UP (ref 0–4)
SARS-COV-2 RNA SPEC QL NAA+PROBE: SIGNIFICANT CHANGE UP
SODIUM SERPL-SCNC: 143 MMOL/L — SIGNIFICANT CHANGE UP (ref 135–146)
SP GR SPEC: 1.02 — SIGNIFICANT CHANGE UP (ref 1.01–1.03)
UROBILINOGEN FLD QL: SIGNIFICANT CHANGE UP
WBC # BLD: 5 K/UL — SIGNIFICANT CHANGE UP (ref 4.8–10.8)
WBC # FLD AUTO: 5 K/UL — SIGNIFICANT CHANGE UP (ref 4.8–10.8)
WBC UR QL: 20 /HPF — HIGH (ref 0–5)

## 2021-02-28 PROCEDURE — 93010 ELECTROCARDIOGRAM REPORT: CPT

## 2021-02-28 PROCEDURE — 74177 CT ABD & PELVIS W/CONTRAST: CPT | Mod: 26

## 2021-02-28 PROCEDURE — 71045 X-RAY EXAM CHEST 1 VIEW: CPT | Mod: 26

## 2021-02-28 PROCEDURE — 99285 EMERGENCY DEPT VISIT HI MDM: CPT

## 2021-02-28 RX ORDER — SODIUM CHLORIDE 9 MG/ML
1000 INJECTION INTRAMUSCULAR; INTRAVENOUS; SUBCUTANEOUS ONCE
Refills: 0 | Status: COMPLETED | OUTPATIENT
Start: 2021-02-28 | End: 2021-02-28

## 2021-02-28 RX ORDER — TAMSULOSIN HYDROCHLORIDE 0.4 MG/1
0.4 CAPSULE ORAL AT BEDTIME
Refills: 0 | Status: DISCONTINUED | OUTPATIENT
Start: 2021-02-28 | End: 2021-03-03

## 2021-02-28 RX ORDER — ACETAMINOPHEN 500 MG
650 TABLET ORAL EVERY 6 HOURS
Refills: 0 | Status: DISCONTINUED | OUTPATIENT
Start: 2021-02-28 | End: 2021-03-03

## 2021-02-28 RX ORDER — INSULIN LISPRO 100/ML
VIAL (ML) SUBCUTANEOUS
Refills: 0 | Status: DISCONTINUED | OUTPATIENT
Start: 2021-02-28 | End: 2021-03-03

## 2021-02-28 RX ORDER — ATORVASTATIN CALCIUM 80 MG/1
40 TABLET, FILM COATED ORAL AT BEDTIME
Refills: 0 | Status: DISCONTINUED | OUTPATIENT
Start: 2021-02-28 | End: 2021-03-03

## 2021-02-28 RX ORDER — ATENOLOL 25 MG/1
50 TABLET ORAL DAILY
Refills: 0 | Status: DISCONTINUED | OUTPATIENT
Start: 2021-02-28 | End: 2021-03-03

## 2021-02-28 RX ORDER — ASPIRIN/CALCIUM CARB/MAGNESIUM 324 MG
81 TABLET ORAL DAILY
Refills: 0 | Status: DISCONTINUED | OUTPATIENT
Start: 2021-02-28 | End: 2021-03-03

## 2021-02-28 RX ORDER — LOSARTAN POTASSIUM 100 MG/1
100 TABLET, FILM COATED ORAL DAILY
Refills: 0 | Status: DISCONTINUED | OUTPATIENT
Start: 2021-02-28 | End: 2021-03-03

## 2021-02-28 RX ORDER — CEFTRIAXONE 500 MG/1
1000 INJECTION, POWDER, FOR SOLUTION INTRAMUSCULAR; INTRAVENOUS ONCE
Refills: 0 | Status: COMPLETED | OUTPATIENT
Start: 2021-02-28 | End: 2021-02-28

## 2021-02-28 RX ORDER — CEFTRIAXONE 500 MG/1
1000 INJECTION, POWDER, FOR SOLUTION INTRAMUSCULAR; INTRAVENOUS EVERY 24 HOURS
Refills: 0 | Status: DISCONTINUED | OUTPATIENT
Start: 2021-02-28 | End: 2021-03-02

## 2021-02-28 RX ORDER — ENOXAPARIN SODIUM 100 MG/ML
40 INJECTION SUBCUTANEOUS DAILY
Refills: 0 | Status: DISCONTINUED | OUTPATIENT
Start: 2021-02-28 | End: 2021-03-03

## 2021-02-28 RX ORDER — ONDANSETRON 8 MG/1
4 TABLET, FILM COATED ORAL ONCE
Refills: 0 | Status: COMPLETED | OUTPATIENT
Start: 2021-02-28 | End: 2021-02-28

## 2021-02-28 RX ORDER — AMLODIPINE BESYLATE 2.5 MG/1
10 TABLET ORAL DAILY
Refills: 0 | Status: DISCONTINUED | OUTPATIENT
Start: 2021-02-28 | End: 2021-03-03

## 2021-02-28 RX ORDER — KETOROLAC TROMETHAMINE 30 MG/ML
15 SYRINGE (ML) INJECTION EVERY 6 HOURS
Refills: 0 | Status: DISCONTINUED | OUTPATIENT
Start: 2021-02-28 | End: 2021-03-03

## 2021-02-28 RX ORDER — CHLORHEXIDINE GLUCONATE 213 G/1000ML
1 SOLUTION TOPICAL
Refills: 0 | Status: DISCONTINUED | OUTPATIENT
Start: 2021-02-28 | End: 2021-03-03

## 2021-02-28 RX ORDER — MYCOPHENOLATE MOFETIL 250 MG/1
500 CAPSULE ORAL
Refills: 0 | Status: DISCONTINUED | OUTPATIENT
Start: 2021-02-28 | End: 2021-03-03

## 2021-02-28 RX ORDER — KETOROLAC TROMETHAMINE 30 MG/ML
15 SYRINGE (ML) INJECTION ONCE
Refills: 0 | Status: DISCONTINUED | OUTPATIENT
Start: 2021-02-28 | End: 2021-02-28

## 2021-02-28 RX ORDER — PREGABALIN 225 MG/1
1000 CAPSULE ORAL DAILY
Refills: 0 | Status: DISCONTINUED | OUTPATIENT
Start: 2021-02-28 | End: 2021-03-03

## 2021-02-28 RX ORDER — INSULIN GLARGINE 100 [IU]/ML
5 INJECTION, SOLUTION SUBCUTANEOUS AT BEDTIME
Refills: 0 | Status: DISCONTINUED | OUTPATIENT
Start: 2021-02-28 | End: 2021-03-03

## 2021-02-28 RX ADMIN — Medication 15 MILLIGRAM(S): at 16:23

## 2021-02-28 RX ADMIN — CEFTRIAXONE 100 MILLIGRAM(S): 500 INJECTION, POWDER, FOR SOLUTION INTRAMUSCULAR; INTRAVENOUS at 16:52

## 2021-02-28 RX ADMIN — SODIUM CHLORIDE 1000 MILLILITER(S): 9 INJECTION INTRAMUSCULAR; INTRAVENOUS; SUBCUTANEOUS at 16:23

## 2021-02-28 RX ADMIN — ONDANSETRON 4 MILLIGRAM(S): 8 TABLET, FILM COATED ORAL at 16:23

## 2021-02-28 NOTE — ED PROVIDER NOTE - NS ED ROS FT
Constitutional: (-) fever  Eyes/ENT: (-) blurry vision, (-) epistaxis  Cardiovascular: (-) chest pain, (-) syncope  Respiratory: (-) cough, (-) shortness of breath  Gastrointestinal: (-) vomiting, (-) diarrhea (+) flank pain  Musculoskeletal: (-) neck pain, (-) back pain, (-) joint pain  Integumentary: (-) rash, (-) edema  Neurological: (-) headache, (-) altered mental status  Psychiatric: (-) hallucinations  Allergic/Immunologic: (-) pruritus

## 2021-02-28 NOTE — ED PROVIDER NOTE - OBJECTIVE STATEMENT
Pt is a 65 year old female with PMH DM, HTN, HLD, PMR presents to ED with complaints of Flank pain. Pt states pain started x2 weeks ago, located to R flank and was noted to have hematuria at [facility]. Pt placed on abx, which she completed with no relief. Pt states pain is moderate, radiating to  her R groin with no alleviating or aggravating factors. Pt attests to associated nausea with no vomiting. Pt denies any fever, chills, bodyaches, chest pain, sob, abdominal pain, VCD, dysuria

## 2021-02-28 NOTE — ED ADULT NURSE NOTE - INTERVENTIONS DEFINITIONS
[place] to call system/Call bell, personal items and telephone within reach/Instruct patient to call for assistance/Physically safe environment: no spills, clutter or unnecessary equipment/Stretcher in lowest position, wheels locked, appropriate side rails in place/Monitor gait and stability

## 2021-02-28 NOTE — ED ADULT NURSE REASSESSMENT NOTE - NS ED NURSE REASSESS COMMENT FT1
patient received from previous RN. Patient admitted to medicine and waiting for a bed upstairs. Patient in stable condition and nad.  Patient offers no complaints.  Will continue to monitor.

## 2021-02-28 NOTE — ED PROVIDER NOTE - CLINICAL SUMMARY MEDICAL DECISION MAKING FREE TEXT BOX
65F with pmh htn, hld, t2dm who p/w R flank pain, worsening x2w. She was seen at [facility] who placed her on keflex, which she completed. Pain radiates to her R groin, no aggravating or alleviating Sx. Denies fevers, n/v, decreased urination. On arrival temp 100.2, pt not ill appearing, Gen - NAD, Head - NCAT, Pharynx - clear, MMM, Heart - RRR, no m/g/r, Lungs - CTAB, no w/c/r, Abdomen - + R CVA ttp, soft, NT, ND, Skin - No rash, Extremities - FROM, no edema, erythema, ecchymosis, Neuro - CN 2-12 intact, nl strength and sensation, nl gait. labs and imaging reviewed. UA confirms pyelo- rocephin, ivf, toradol given. L non-obstructing renal calculus noted. CT also showed incidental cystic lesions, pt made aware. Will admit for further mgmt.

## 2021-02-28 NOTE — ED ADULT TRIAGE NOTE - CHIEF COMPLAINT QUOTE
right flank pain x 2 weeks ago, was told she had blood in urine and possible stone. denies any dysuria c/o worsening pain

## 2021-02-28 NOTE — ED PROVIDER NOTE - CARE PLAN
Principal Discharge DX:	Pancreatic lesion  Secondary Diagnosis:	UTI (urinary tract infection)  Secondary Diagnosis:	Transaminitis   Principal Discharge DX:	Pancreatic lesion  Secondary Diagnosis:	Transaminitis  Secondary Diagnosis:	Renal calculus, left  Secondary Diagnosis:	Pyelonephritis

## 2021-02-28 NOTE — ED ADULT NURSE NOTE - OBJECTIVE STATEMENT
pt c/o rt flank and rlq abdominal pain x 3 weeks - was on abx but finished course few days ago - still having pain.  Denies pain during urination.

## 2021-03-01 LAB
ALBUMIN SERPL ELPH-MCNC: 4.1 G/DL — SIGNIFICANT CHANGE UP (ref 3.5–5.2)
ALP SERPL-CCNC: 122 U/L — HIGH (ref 30–115)
ALT FLD-CCNC: 71 U/L — HIGH (ref 0–41)
ANION GAP SERPL CALC-SCNC: 12 MMOL/L — SIGNIFICANT CHANGE UP (ref 7–14)
APPEARANCE UR: CLEAR — SIGNIFICANT CHANGE UP
AST SERPL-CCNC: 56 U/L — HIGH (ref 0–41)
BACTERIA # UR AUTO: NEGATIVE — SIGNIFICANT CHANGE UP
BASOPHILS # BLD AUTO: 0.01 K/UL — SIGNIFICANT CHANGE UP (ref 0–0.2)
BASOPHILS NFR BLD AUTO: 0.3 % — SIGNIFICANT CHANGE UP (ref 0–1)
BILIRUB DIRECT SERPL-MCNC: <0.2 MG/DL — SIGNIFICANT CHANGE UP (ref 0–0.2)
BILIRUB INDIRECT FLD-MCNC: >0.3 MG/DL — SIGNIFICANT CHANGE UP (ref 0.2–1.2)
BILIRUB SERPL-MCNC: 0.5 MG/DL — SIGNIFICANT CHANGE UP (ref 0.2–1.2)
BILIRUB UR-MCNC: NEGATIVE — SIGNIFICANT CHANGE UP
BUN SERPL-MCNC: 15 MG/DL — SIGNIFICANT CHANGE UP (ref 10–20)
CALCIUM SERPL-MCNC: 9.9 MG/DL — SIGNIFICANT CHANGE UP (ref 8.5–10.1)
CHLORIDE SERPL-SCNC: 101 MMOL/L — SIGNIFICANT CHANGE UP (ref 98–110)
CO2 SERPL-SCNC: 27 MMOL/L — SIGNIFICANT CHANGE UP (ref 17–32)
COLOR SPEC: SIGNIFICANT CHANGE UP
CREAT SERPL-MCNC: 1 MG/DL — SIGNIFICANT CHANGE UP (ref 0.7–1.5)
DIFF PNL FLD: SIGNIFICANT CHANGE UP
EOSINOPHIL # BLD AUTO: 0.04 K/UL — SIGNIFICANT CHANGE UP (ref 0–0.7)
EOSINOPHIL NFR BLD AUTO: 1.3 % — SIGNIFICANT CHANGE UP (ref 0–8)
EPI CELLS # UR: 1 /HPF — SIGNIFICANT CHANGE UP (ref 0–5)
GLUCOSE BLDC GLUCOMTR-MCNC: 110 MG/DL — HIGH (ref 70–99)
GLUCOSE BLDC GLUCOMTR-MCNC: 119 MG/DL — HIGH (ref 70–99)
GLUCOSE BLDC GLUCOMTR-MCNC: 179 MG/DL — HIGH (ref 70–99)
GLUCOSE BLDC GLUCOMTR-MCNC: 95 MG/DL — SIGNIFICANT CHANGE UP (ref 70–99)
GLUCOSE BLDC GLUCOMTR-MCNC: 98 MG/DL — SIGNIFICANT CHANGE UP (ref 70–99)
GLUCOSE SERPL-MCNC: 97 MG/DL — SIGNIFICANT CHANGE UP (ref 70–99)
GLUCOSE UR QL: NEGATIVE — SIGNIFICANT CHANGE UP
HAV IGM SER-ACNC: SIGNIFICANT CHANGE UP
HBV CORE IGM SER-ACNC: SIGNIFICANT CHANGE UP
HBV SURFACE AG SER-ACNC: SIGNIFICANT CHANGE UP
HCT VFR BLD CALC: 33.6 % — LOW (ref 37–47)
HCV AB S/CO SERPL IA: 0.08 S/CO — SIGNIFICANT CHANGE UP (ref 0–0.99)
HCV AB SERPL-IMP: SIGNIFICANT CHANGE UP
HGB BLD-MCNC: 11 G/DL — LOW (ref 12–16)
HYALINE CASTS # UR AUTO: 0 /LPF — SIGNIFICANT CHANGE UP (ref 0–7)
IMM GRANULOCYTES NFR BLD AUTO: 0.3 % — SIGNIFICANT CHANGE UP (ref 0.1–0.3)
IRON SATN MFR SERPL: 12 % — LOW (ref 15–50)
IRON SATN MFR SERPL: 37 UG/DL — SIGNIFICANT CHANGE UP (ref 35–150)
KETONES UR-MCNC: NEGATIVE — SIGNIFICANT CHANGE UP
LEUKOCYTE ESTERASE UR-ACNC: NEGATIVE — SIGNIFICANT CHANGE UP
LYMPHOCYTES # BLD AUTO: 0.86 K/UL — LOW (ref 1.2–3.4)
LYMPHOCYTES # BLD AUTO: 27.1 % — SIGNIFICANT CHANGE UP (ref 20.5–51.1)
MAGNESIUM SERPL-MCNC: 1.3 MG/DL — LOW (ref 1.8–2.4)
MCHC RBC-ENTMCNC: 32.7 G/DL — SIGNIFICANT CHANGE UP (ref 32–37)
MCHC RBC-ENTMCNC: 32.7 PG — HIGH (ref 27–31)
MCV RBC AUTO: 100 FL — HIGH (ref 81–99)
MONOCYTES # BLD AUTO: 0.3 K/UL — SIGNIFICANT CHANGE UP (ref 0.1–0.6)
MONOCYTES NFR BLD AUTO: 9.5 % — HIGH (ref 1.7–9.3)
NEUTROPHILS # BLD AUTO: 1.95 K/UL — SIGNIFICANT CHANGE UP (ref 1.4–6.5)
NEUTROPHILS NFR BLD AUTO: 61.5 % — SIGNIFICANT CHANGE UP (ref 42.2–75.2)
NITRITE UR-MCNC: NEGATIVE — SIGNIFICANT CHANGE UP
NRBC # BLD: 0 /100 WBCS — SIGNIFICANT CHANGE UP (ref 0–0)
PH UR: 6.5 — SIGNIFICANT CHANGE UP (ref 5–8)
PLATELET # BLD AUTO: 374 K/UL — SIGNIFICANT CHANGE UP (ref 130–400)
POTASSIUM SERPL-MCNC: 4.5 MMOL/L — SIGNIFICANT CHANGE UP (ref 3.5–5)
POTASSIUM SERPL-SCNC: 4.5 MMOL/L — SIGNIFICANT CHANGE UP (ref 3.5–5)
PROT SERPL-MCNC: 7.2 G/DL — SIGNIFICANT CHANGE UP (ref 6–8)
PROT UR-MCNC: ABNORMAL
RBC # BLD: 3.36 M/UL — LOW (ref 4.2–5.4)
RBC # FLD: 14 % — SIGNIFICANT CHANGE UP (ref 11.5–14.5)
RBC CASTS # UR COMP ASSIST: 6 /HPF — HIGH (ref 0–4)
SODIUM SERPL-SCNC: 140 MMOL/L — SIGNIFICANT CHANGE UP (ref 135–146)
SP GR SPEC: 1.02 — SIGNIFICANT CHANGE UP (ref 1.01–1.03)
TIBC SERPL-MCNC: 304 UG/DL — SIGNIFICANT CHANGE UP (ref 220–430)
TRANSFERRIN SERPL-MCNC: 260 MG/DL — SIGNIFICANT CHANGE UP (ref 200–360)
UIBC SERPL-MCNC: 267 UG/DL — SIGNIFICANT CHANGE UP (ref 110–370)
UROBILINOGEN FLD QL: SIGNIFICANT CHANGE UP
WBC # BLD: 3.17 K/UL — LOW (ref 4.8–10.8)
WBC # FLD AUTO: 3.17 K/UL — LOW (ref 4.8–10.8)
WBC UR QL: 3 /HPF — SIGNIFICANT CHANGE UP (ref 0–5)

## 2021-03-01 PROCEDURE — 99223 1ST HOSP IP/OBS HIGH 75: CPT | Mod: AI

## 2021-03-01 RX ORDER — MAGNESIUM SULFATE 500 MG/ML
2 VIAL (ML) INJECTION ONCE
Refills: 0 | Status: COMPLETED | OUTPATIENT
Start: 2021-03-01 | End: 2021-03-01

## 2021-03-01 RX ADMIN — LOSARTAN POTASSIUM 100 MILLIGRAM(S): 100 TABLET, FILM COATED ORAL at 05:46

## 2021-03-01 RX ADMIN — Medication 50 GRAM(S): at 09:50

## 2021-03-01 RX ADMIN — ATORVASTATIN CALCIUM 40 MILLIGRAM(S): 80 TABLET, FILM COATED ORAL at 21:19

## 2021-03-01 RX ADMIN — CEFTRIAXONE 100 MILLIGRAM(S): 500 INJECTION, POWDER, FOR SOLUTION INTRAMUSCULAR; INTRAVENOUS at 17:29

## 2021-03-01 RX ADMIN — MYCOPHENOLATE MOFETIL 500 MILLIGRAM(S): 250 CAPSULE ORAL at 18:17

## 2021-03-01 RX ADMIN — ATENOLOL 50 MILLIGRAM(S): 25 TABLET ORAL at 05:46

## 2021-03-01 RX ADMIN — INSULIN GLARGINE 5 UNIT(S): 100 INJECTION, SOLUTION SUBCUTANEOUS at 21:19

## 2021-03-01 RX ADMIN — INSULIN GLARGINE 5 UNIT(S): 100 INJECTION, SOLUTION SUBCUTANEOUS at 00:49

## 2021-03-01 RX ADMIN — AMLODIPINE BESYLATE 10 MILLIGRAM(S): 2.5 TABLET ORAL at 05:47

## 2021-03-01 RX ADMIN — TAMSULOSIN HYDROCHLORIDE 0.4 MILLIGRAM(S): 0.4 CAPSULE ORAL at 21:19

## 2021-03-01 RX ADMIN — Medication 81 MILLIGRAM(S): at 13:30

## 2021-03-01 RX ADMIN — ENOXAPARIN SODIUM 40 MILLIGRAM(S): 100 INJECTION SUBCUTANEOUS at 13:30

## 2021-03-01 RX ADMIN — MYCOPHENOLATE MOFETIL 500 MILLIGRAM(S): 250 CAPSULE ORAL at 05:46

## 2021-03-01 RX ADMIN — PREGABALIN 1000 MICROGRAM(S): 225 CAPSULE ORAL at 13:30

## 2021-03-01 NOTE — H&P ADULT - ASSESSMENT
Patient is a 66yo female with PMH of polymyalgia rheumatica, diabetes mellitus, hypertension, and hyperlipidemia who presented to the hospital complaining of right flank for the past two weeks associated with hematuria. CT abdomen/pelvis revealed no acute intra-abdominal pathology, but also showed an unchanged left lower pole non-obstructing calculus measuring 1.1x0.6cm and 7mm and 8mm cystic lesions in the pancreatic head and neck, respectively.    #Right pyelonephritis  - Failed outpatient therapy with 7-day course of cephalexin 500mg  - CVA tenderness present on right, although CT abdomen/pelvis shows left renal calculus  - Continue with ceftriaxone 1g IV Q24H  - Follow urine cultures  - Start ketorolac 15mg IV Q6H PRN pain and tamsulosin 0.4mg PO at bedtime     #Mild transaminitis  - AST 51, ALT 67, alkaline phosphatase 121  - Follow acute hepatitis panel  - Trend LFTs    #7mm and 8mm cystic lesions in the pancreatic head and neck  - CT abdomen/pelvis 2/27/2021: 7mm and 8mm cystic lesions in the pancreatic head and neck, respectively  - Per patient, she had a previous IPMN lesion that was evaluated by MRI several years ago  - Follow outpatient with repeat MRI/MRCP    #Macrocytic anemia  - Baseline hemoglobin: unknown  - Hemoglobin today: 11.0  - Patient was previously taking methotrexate for her polymyalgia rheumatica  - Follow iron, TIBC, transferrin, ferritin, B12, and folic acid  - Keep active type and screen  - Monitor hemoglobin/hematocrit     #Left renal calculus  - Follow outpatient with urology  - Start tamsulosin 0.4mg PO at bedtime     #Diabetes mellitus type 2  - Hemoglobin A1c in AM  - Hold home oral medications  - Start insulin glargine 5 units SQ QHS  - Insulin sliding scale coverage  - Monitor fingerstick glucose    #Hypertension  - Continue with amlodipine 10mg PO once daily, atenolol 50mg PO once daily and losartan 100mg PO once daily   - Monitor blood pressure    #Polymyalgia rheumatica  - Patient stopped taking methotrexate on 2/26/2021 and started taking mycophenolate mofetil on 2/27/2021  - Continue with mycophenolate mofetil 500mg PO twice daily     #Hyperlipidemia  - Continue with atorvastatin 40mg PO at bedtime     #Suspected vitamin B12 deficiency  - Continue with cyanocobalamin 1000mcg PO once daily     #Misc  - DVT Prophylaxis: Lovenox 40mg SQ once daily   - GI Prophylaxis: not indicated   - Diet: DASH/TLC  - Activity: ambulate as tolerated  - IV Fluids: not indicated   - Code Status: Full Code    Dispo: admit to medicine

## 2021-03-01 NOTE — H&P ADULT - ATTENDING COMMENTS
64 YO F with a PMH of polymyalgia rheumatica, DM2, HTN, and HLD who presents to the hospital with a c/o right flank pain for the past x 2 weeks. Described as sharp and radiating towards ABD, was constant but now is resolving. The pt denies any hematuria with me. Denies any fevers/chills, dysuria, rashes, N/V/D, CP, SOB, cough, or LE swelling. Of note, pt went to an UCC x 1 week ago and was diagnosed with a UTI and given RX for Cipro, did not alleviate symptoms as per pt.     In the ED, CT-AP w/ IV contrast showed left-sided non-obstructing renal calculi and incidentally found cystic lesions in the pancreas. UA had LE and pyuria. Cultures sent and pt was started on IV ABXs (Ceftriaxone) in the ED.     Physical exam shows pt in NAD. VSS, afebrile, not hypoxic on RA. A&Ox3. Non-focal neuro exam. Muscle strength/sensation intact. CTA B/L with no W/C/R. RRR, no M/G/R. ABD is soft and non-tender, normoactive BSs; No CVA TTP. LEs without swelling. No rashes. Labs and radiology as above.     Right flank pain, resolving; suspect passed renal stone vs pyelonephritis. No sepsis on admission. FU cultures. IVFs (LR). PRN pain meds. IV ABXs (Ceftriaxone).    Mild transaminitis, likely intrahepatic cholestasis from recent ABX use. IVFs. Repeat LFTs in the AM.     Macrocytic anemia, at baseline. Pt denies bleeding symptoms or hematuria with me. Send anemia work-up. Replace as necessary.     Incidentally found pancreatic cysts on CT-AP. Pt made aware and understands she will need out-pt FU with GI.     Hx of polymyalgia rheumatica, DM2, HTN, and HLD. Restart home meds, except as stated above. DVT PPX. Inform PCP of pt's admission to hospital. My note supersedes the residents note.

## 2021-03-01 NOTE — H&P ADULT - NSICDXPASTMEDICALHX_GEN_ALL_CORE_FT
PAST MEDICAL HISTORY:  Diabetes     Hyperlipidemia     Hypertension     Pancreatic lesion     Polymyalgia rheumatica

## 2021-03-01 NOTE — H&P ADULT - NSHPPHYSICALEXAM_GEN_ALL_CORE
CONSTITUTIONAL: No acute distress, well-developed, well-groomed, AAOx3  HEAD: Atraumatic, normocephalic  EYES: EOM intact, PERRLA, conjunctiva and sclera clear  ENT: Supple, no masses, no thyromegaly, no bruits, no JVD; moist mucous membranes  PULMONARY: Clear to auscultation bilaterally; no wheezes, rales, or rhonchi  CARDIOVASCULAR: Regular rate and rhythm; no murmurs, rubs, or gallops  GASTROINTESTINAL: Soft, non-tender, non-distended; bowel sounds present  MUSCULOSKELETAL: 2+ peripheral pulses; no clubbing, no cyanosis, no edema, (+) right CVA tenderness  NEUROLOGY: non-focal  SKIN: No rashes or lesions; warm and dry

## 2021-03-01 NOTE — H&P ADULT - HISTORY OF PRESENT ILLNESS
Patient is a 66yo female with PMH of polymyalgia rheumatica, diabetes mellitus, hypertension, and hyperlipidemia who presented to the hospital complaining of right flank for the past two weeks associated with hematuria. The patient had gone to urgent care and was diagnosed with a urinary tract infection. She was prescribed a 7-day course of cephalexin 500mg, which did not alleviate her symptoms. She was seen by her rheumatologist recently, who found WBC on urinalysis without any RBC. She also endorses nausea and some "dark spots in her urine"    In the ED, vital signs were Tmax 100.2F, HR 96, /83, RR 18, SpO2 100% on room air. Labs were significant for hemoglobin 11, , alkaline phosphatase 121, AST 51, and ALT 67. CT abdomen/pelvis revealed no acute intra-abdominal pathology, but also showed an unchanged left lower pole non-obstructing calculus measuring 1.1x0.6cm and 7mm and 8mm cystic lesions in the pancreatic head and neck, respectively. Urinalysis was positive for large leukocyte esterase, small blood, and 20 WBC. Patient was given 1L bolus of LR, ketorolac 15mg IV, ondansetron 4mg IV, and ceftriaxone 1g IV.

## 2021-03-01 NOTE — H&P ADULT - NSHPSOCIALHISTORY_GEN_ALL_CORE
Marital Status:   Living Situation: lives at home with   Occupation: currently employed  Tobacco Use: denies  Sexual History: sexually active in monogamous relationship  Functional Status: fully functional in all ADLs and IADLs

## 2021-03-02 LAB
ALBUMIN SERPL ELPH-MCNC: 4 G/DL — SIGNIFICANT CHANGE UP (ref 3.5–5.2)
ALP SERPL-CCNC: 126 U/L — HIGH (ref 30–115)
ALT FLD-CCNC: 78 U/L — HIGH (ref 0–41)
ANION GAP SERPL CALC-SCNC: 12 MMOL/L — SIGNIFICANT CHANGE UP (ref 7–14)
AST SERPL-CCNC: 68 U/L — HIGH (ref 0–41)
BASOPHILS # BLD AUTO: 0.03 K/UL — SIGNIFICANT CHANGE UP (ref 0–0.2)
BASOPHILS NFR BLD AUTO: 0.8 % — SIGNIFICANT CHANGE UP (ref 0–1)
BILIRUB SERPL-MCNC: 0.4 MG/DL — SIGNIFICANT CHANGE UP (ref 0.2–1.2)
BUN SERPL-MCNC: 21 MG/DL — HIGH (ref 10–20)
CALCIUM SERPL-MCNC: 9.6 MG/DL — SIGNIFICANT CHANGE UP (ref 8.5–10.1)
CHLORIDE SERPL-SCNC: 101 MMOL/L — SIGNIFICANT CHANGE UP (ref 98–110)
CO2 SERPL-SCNC: 25 MMOL/L — SIGNIFICANT CHANGE UP (ref 17–32)
CREAT SERPL-MCNC: 1.1 MG/DL — SIGNIFICANT CHANGE UP (ref 0.7–1.5)
EOSINOPHIL # BLD AUTO: 0.15 K/UL — SIGNIFICANT CHANGE UP (ref 0–0.7)
EOSINOPHIL NFR BLD AUTO: 4 % — SIGNIFICANT CHANGE UP (ref 0–8)
FERRITIN SERPL-MCNC: 214 NG/ML — HIGH (ref 15–150)
FOLATE SERPL-MCNC: 8 NG/ML — SIGNIFICANT CHANGE UP
GLUCOSE BLDC GLUCOMTR-MCNC: 118 MG/DL — HIGH (ref 70–99)
GLUCOSE BLDC GLUCOMTR-MCNC: 130 MG/DL — HIGH (ref 70–99)
GLUCOSE BLDC GLUCOMTR-MCNC: 132 MG/DL — HIGH (ref 70–99)
GLUCOSE BLDC GLUCOMTR-MCNC: 98 MG/DL — SIGNIFICANT CHANGE UP (ref 70–99)
GLUCOSE SERPL-MCNC: 112 MG/DL — HIGH (ref 70–99)
HCT VFR BLD CALC: 33.9 % — LOW (ref 37–47)
HGB BLD-MCNC: 11.2 G/DL — LOW (ref 12–16)
IMM GRANULOCYTES NFR BLD AUTO: 0.3 % — SIGNIFICANT CHANGE UP (ref 0.1–0.3)
LYMPHOCYTES # BLD AUTO: 1.33 K/UL — SIGNIFICANT CHANGE UP (ref 1.2–3.4)
LYMPHOCYTES # BLD AUTO: 35.7 % — SIGNIFICANT CHANGE UP (ref 20.5–51.1)
MAGNESIUM SERPL-MCNC: 1.8 MG/DL — SIGNIFICANT CHANGE UP (ref 1.8–2.4)
MCHC RBC-ENTMCNC: 32.9 PG — HIGH (ref 27–31)
MCHC RBC-ENTMCNC: 33 G/DL — SIGNIFICANT CHANGE UP (ref 32–37)
MCV RBC AUTO: 99.7 FL — HIGH (ref 81–99)
MONOCYTES # BLD AUTO: 0.25 K/UL — SIGNIFICANT CHANGE UP (ref 0.1–0.6)
MONOCYTES NFR BLD AUTO: 6.7 % — SIGNIFICANT CHANGE UP (ref 1.7–9.3)
NEUTROPHILS # BLD AUTO: 1.96 K/UL — SIGNIFICANT CHANGE UP (ref 1.4–6.5)
NEUTROPHILS NFR BLD AUTO: 52.5 % — SIGNIFICANT CHANGE UP (ref 42.2–75.2)
NRBC # BLD: 0 /100 WBCS — SIGNIFICANT CHANGE UP (ref 0–0)
PLATELET # BLD AUTO: 415 K/UL — HIGH (ref 130–400)
POTASSIUM SERPL-MCNC: 4.2 MMOL/L — SIGNIFICANT CHANGE UP (ref 3.5–5)
POTASSIUM SERPL-SCNC: 4.2 MMOL/L — SIGNIFICANT CHANGE UP (ref 3.5–5)
PROT SERPL-MCNC: 7.2 G/DL — SIGNIFICANT CHANGE UP (ref 6–8)
RBC # BLD: 3.4 M/UL — LOW (ref 4.2–5.4)
RBC # FLD: 13.6 % — SIGNIFICANT CHANGE UP (ref 11.5–14.5)
SODIUM SERPL-SCNC: 138 MMOL/L — SIGNIFICANT CHANGE UP (ref 135–146)
VIT B12 SERPL-MCNC: 1728 PG/ML — HIGH (ref 232–1245)
WBC # BLD: 3.73 K/UL — LOW (ref 4.8–10.8)
WBC # FLD AUTO: 3.73 K/UL — LOW (ref 4.8–10.8)

## 2021-03-02 PROCEDURE — 76705 ECHO EXAM OF ABDOMEN: CPT | Mod: 26

## 2021-03-02 PROCEDURE — 99233 SBSQ HOSP IP/OBS HIGH 50: CPT

## 2021-03-02 RX ORDER — MEROPENEM 1 G/30ML
1000 INJECTION INTRAVENOUS EVERY 8 HOURS
Refills: 0 | Status: DISCONTINUED | OUTPATIENT
Start: 2021-03-02 | End: 2021-03-03

## 2021-03-02 RX ORDER — MAGNESIUM SULFATE 500 MG/ML
2 VIAL (ML) INJECTION ONCE
Refills: 0 | Status: COMPLETED | OUTPATIENT
Start: 2021-03-02 | End: 2021-03-02

## 2021-03-02 RX ADMIN — MYCOPHENOLATE MOFETIL 500 MILLIGRAM(S): 250 CAPSULE ORAL at 06:00

## 2021-03-02 RX ADMIN — AMLODIPINE BESYLATE 10 MILLIGRAM(S): 2.5 TABLET ORAL at 06:01

## 2021-03-02 RX ADMIN — Medication 50 GRAM(S): at 10:55

## 2021-03-02 RX ADMIN — MEROPENEM 100 MILLIGRAM(S): 1 INJECTION INTRAVENOUS at 12:39

## 2021-03-02 RX ADMIN — ENOXAPARIN SODIUM 40 MILLIGRAM(S): 100 INJECTION SUBCUTANEOUS at 11:05

## 2021-03-02 RX ADMIN — MYCOPHENOLATE MOFETIL 500 MILLIGRAM(S): 250 CAPSULE ORAL at 16:32

## 2021-03-02 RX ADMIN — ATENOLOL 50 MILLIGRAM(S): 25 TABLET ORAL at 06:00

## 2021-03-02 RX ADMIN — INSULIN GLARGINE 5 UNIT(S): 100 INJECTION, SOLUTION SUBCUTANEOUS at 21:23

## 2021-03-02 RX ADMIN — LOSARTAN POTASSIUM 100 MILLIGRAM(S): 100 TABLET, FILM COATED ORAL at 06:00

## 2021-03-02 RX ADMIN — MEROPENEM 100 MILLIGRAM(S): 1 INJECTION INTRAVENOUS at 21:22

## 2021-03-02 RX ADMIN — TAMSULOSIN HYDROCHLORIDE 0.4 MILLIGRAM(S): 0.4 CAPSULE ORAL at 21:23

## 2021-03-02 RX ADMIN — ATORVASTATIN CALCIUM 40 MILLIGRAM(S): 80 TABLET, FILM COATED ORAL at 21:23

## 2021-03-02 RX ADMIN — Medication 81 MILLIGRAM(S): at 11:04

## 2021-03-02 RX ADMIN — PREGABALIN 1000 MICROGRAM(S): 225 CAPSULE ORAL at 11:04

## 2021-03-02 NOTE — PROGRESS NOTE ADULT - ATTENDING COMMENTS
Patient seen and examined, states she is feeling well aside from intermittent right flank pain. Discussed case with patient's , Golden, over speakerphone using patient's cellphone.     #Pyelonephritis secondary Pseudomonas: f/u urine culture sensitivities, on Merrem now, awaiting ID recommendations, pain control    #Transaminitis: uptrending, f/u RUQ US     #Incidentally found pancreatic cysts on CT, patient to follow up with outpatient GI and need for MRI pancreatic protocol    #DM: fingersticks controlled, continue basal bolus insulin    #HLD: on statin    Disposition: awaiting ID evaluation, RUQ US, home once medically stable

## 2021-03-03 ENCOUNTER — TRANSCRIPTION ENCOUNTER (OUTPATIENT)
Age: 66
End: 2021-03-03

## 2021-03-03 VITALS
TEMPERATURE: 97 F | SYSTOLIC BLOOD PRESSURE: 113 MMHG | RESPIRATION RATE: 18 BRPM | DIASTOLIC BLOOD PRESSURE: 67 MMHG | HEART RATE: 81 BPM

## 2021-03-03 LAB
-  AMIKACIN: SIGNIFICANT CHANGE UP
-  AZTREONAM: SIGNIFICANT CHANGE UP
-  CEFEPIME: SIGNIFICANT CHANGE UP
-  CEFTAZIDIME: SIGNIFICANT CHANGE UP
-  CIPROFLOXACIN: SIGNIFICANT CHANGE UP
-  GENTAMICIN: SIGNIFICANT CHANGE UP
-  IMIPENEM: SIGNIFICANT CHANGE UP
-  LEVOFLOXACIN: SIGNIFICANT CHANGE UP
-  MEROPENEM: SIGNIFICANT CHANGE UP
-  PIPERACILLIN/TAZOBACTAM: SIGNIFICANT CHANGE UP
-  TOBRAMYCIN: SIGNIFICANT CHANGE UP
ALBUMIN SERPL ELPH-MCNC: 4 G/DL — SIGNIFICANT CHANGE UP (ref 3.5–5.2)
ALP SERPL-CCNC: 112 U/L — SIGNIFICANT CHANGE UP (ref 30–115)
ALT FLD-CCNC: 68 U/L — HIGH (ref 0–41)
ANION GAP SERPL CALC-SCNC: 10 MMOL/L — SIGNIFICANT CHANGE UP (ref 7–14)
AST SERPL-CCNC: 48 U/L — HIGH (ref 0–41)
BILIRUB SERPL-MCNC: 0.3 MG/DL — SIGNIFICANT CHANGE UP (ref 0.2–1.2)
BUN SERPL-MCNC: 25 MG/DL — HIGH (ref 10–20)
CALCIUM SERPL-MCNC: 9.6 MG/DL — SIGNIFICANT CHANGE UP (ref 8.5–10.1)
CHLORIDE SERPL-SCNC: 103 MMOL/L — SIGNIFICANT CHANGE UP (ref 98–110)
CO2 SERPL-SCNC: 26 MMOL/L — SIGNIFICANT CHANGE UP (ref 17–32)
CREAT SERPL-MCNC: 1.1 MG/DL — SIGNIFICANT CHANGE UP (ref 0.7–1.5)
CULTURE RESULTS: SIGNIFICANT CHANGE UP
GLUCOSE BLDC GLUCOMTR-MCNC: 111 MG/DL — HIGH (ref 70–99)
GLUCOSE BLDC GLUCOMTR-MCNC: 141 MG/DL — HIGH (ref 70–99)
GLUCOSE BLDC GLUCOMTR-MCNC: 146 MG/DL — HIGH (ref 70–99)
GLUCOSE SERPL-MCNC: 111 MG/DL — HIGH (ref 70–99)
HCT VFR BLD CALC: 33.3 % — LOW (ref 37–47)
HGB BLD-MCNC: 10.7 G/DL — LOW (ref 12–16)
MCHC RBC-ENTMCNC: 32.1 G/DL — SIGNIFICANT CHANGE UP (ref 32–37)
MCHC RBC-ENTMCNC: 32.4 PG — HIGH (ref 27–31)
MCV RBC AUTO: 100.9 FL — HIGH (ref 81–99)
METHOD TYPE: SIGNIFICANT CHANGE UP
NRBC # BLD: 0 /100 WBCS — SIGNIFICANT CHANGE UP (ref 0–0)
ORGANISM # SPEC MICROSCOPIC CNT: SIGNIFICANT CHANGE UP
ORGANISM # SPEC MICROSCOPIC CNT: SIGNIFICANT CHANGE UP
PLATELET # BLD AUTO: 392 K/UL — SIGNIFICANT CHANGE UP (ref 130–400)
POTASSIUM SERPL-MCNC: 4.8 MMOL/L — SIGNIFICANT CHANGE UP (ref 3.5–5)
POTASSIUM SERPL-SCNC: 4.8 MMOL/L — SIGNIFICANT CHANGE UP (ref 3.5–5)
PROT SERPL-MCNC: 6.9 G/DL — SIGNIFICANT CHANGE UP (ref 6–8)
RBC # BLD: 3.3 M/UL — LOW (ref 4.2–5.4)
RBC # FLD: 13.6 % — SIGNIFICANT CHANGE UP (ref 11.5–14.5)
SODIUM SERPL-SCNC: 139 MMOL/L — SIGNIFICANT CHANGE UP (ref 135–146)
SPECIMEN SOURCE: SIGNIFICANT CHANGE UP
WBC # BLD: 3.23 K/UL — LOW (ref 4.8–10.8)
WBC # FLD AUTO: 3.23 K/UL — LOW (ref 4.8–10.8)

## 2021-03-03 PROCEDURE — 99238 HOSP IP/OBS DSCHRG MGMT 30/<: CPT

## 2021-03-03 RX ORDER — CIPROFLOXACIN LACTATE 400MG/40ML
1 VIAL (ML) INTRAVENOUS
Qty: 12 | Refills: 0
Start: 2021-03-03 | End: 2021-03-08

## 2021-03-03 RX ADMIN — PREGABALIN 1000 MICROGRAM(S): 225 CAPSULE ORAL at 11:36

## 2021-03-03 RX ADMIN — MEROPENEM 100 MILLIGRAM(S): 1 INJECTION INTRAVENOUS at 06:18

## 2021-03-03 RX ADMIN — LOSARTAN POTASSIUM 100 MILLIGRAM(S): 100 TABLET, FILM COATED ORAL at 06:19

## 2021-03-03 RX ADMIN — CHLORHEXIDINE GLUCONATE 1 APPLICATION(S): 213 SOLUTION TOPICAL at 05:55

## 2021-03-03 RX ADMIN — AMLODIPINE BESYLATE 10 MILLIGRAM(S): 2.5 TABLET ORAL at 06:19

## 2021-03-03 RX ADMIN — ATENOLOL 50 MILLIGRAM(S): 25 TABLET ORAL at 06:19

## 2021-03-03 RX ADMIN — Medication 81 MILLIGRAM(S): at 11:36

## 2021-03-03 RX ADMIN — MYCOPHENOLATE MOFETIL 500 MILLIGRAM(S): 250 CAPSULE ORAL at 06:19

## 2021-03-03 RX ADMIN — ENOXAPARIN SODIUM 40 MILLIGRAM(S): 100 INJECTION SUBCUTANEOUS at 11:36

## 2021-03-03 RX ADMIN — MYCOPHENOLATE MOFETIL 500 MILLIGRAM(S): 250 CAPSULE ORAL at 17:12

## 2021-03-03 RX ADMIN — MEROPENEM 100 MILLIGRAM(S): 1 INJECTION INTRAVENOUS at 13:21

## 2021-03-03 NOTE — DISCHARGE NOTE PROVIDER - NSDCCPCAREPLAN_GEN_ALL_CORE_FT
PRINCIPAL DISCHARGE DIAGNOSIS  Diagnosis: Pancreatic lesion  Assessment and Plan of Treatment: -Incidental finding on CT:  < from: CT Abdomen and Pelvis w/ IV Cont (02.28.21 @ 19:02) >  Unchanged nonobstructing left lower pole calculus.  Pancreatic body 0.7 cm cystic lesion and pancreatic neck 0.8 cm cystic lesion. Findings likely represent side branch IPMN, consider correlation with nonemergent MRI/MRCP as clinically warranted.  < end of copied text >  -follow up with outpatient GI and need for MRI pancreatic protocol      SECONDARY DISCHARGE DIAGNOSES  Diagnosis: Pyelonephritis  Assessment and Plan of Treatment: Was on merrem. Switched to Cipro as per ID  -Cipro 750 mg PO twice a day x 6 more days       Diagnosis: Renal calculus, left  Assessment and Plan of Treatment:     Diagnosis: Transaminitis  Assessment and Plan of Treatment: -Trended down  USG Abdomen : Liver: 1.1 cm x 1 cm cyst within the right lobe of the liver.  RUQ US: Contracted gallbladder. Otherwise unremarkable.

## 2021-03-03 NOTE — DISCHARGE NOTE NURSING/CASE MANAGEMENT/SOCIAL WORK - PATIENT PORTAL LINK FT
You can access the FollowMyHealth Patient Portal offered by [facility] by registering at the following website: [email or web address]. By joining [facility]’s FollowMyHealth portal, you will also be able to view your health information using other applications (apps) compatible with our system.

## 2021-03-03 NOTE — PROGRESS NOTE ADULT - ASSESSMENT
Pyelonephritis   secondary Pseudomonas:  On Merrem    awaiting ID recommendations  Pain control    Transaminitis  Trending down  USG Abdomen : Liver: 1.1 cm x 1 cm cyst within the right lobe of the liver.    pancreatic cysts   Incidental finding on CT   follow up with outpatient GI and need for MRI pancreatic protocol    DM:  Fingersticks controlled  continue basal bolus insulin    HLD  on statin    Disposition: awaiting ID evaluation, , home once medically stable.

## 2021-03-03 NOTE — PROGRESS NOTE ADULT - SUBJECTIVE AND OBJECTIVE BOX
66yo female with PMH of polymyalgia rheumatica, diabetes mellitus, hypertension, and hyperlipidemia who presented to the hospital complaining of right flank for the past two weeks associated with hematuria. The patient had gone to urgent care and was diagnosed with a urinary tract infection. She was prescribed a 7-day course of cephalexin 500mg, which did not alleviate her symptoms.  She was seen by her rheumatologist recently, who found WBC on urinalysis without any RBC.   In the ED, vital signs were T max 100.2F, HR 96, /83, RR 18, SpO2 100% on room air. Labs were significant for hemoglobin 11, , alkaline phosphatase 121, AST 51, and ALT 67. CT abdomen/pelvis revealed no acute intra-abdominal pathology, but also showed an unchanged left lower pole non-obstructing calculus measuring 1.1x0.6cm and 7mm and 8mm cystic lesions in the pancreatic head and neck, respectively. Urinalysis was positive for large leukocyte esterase, small blood, and 20 WBC. Patient was given 1L bolus of LR, ketorolac 15mg IV, ondansetron 4mg IV, and ceftriaxone 1g IV.  On floor, patient was continued on antibiotics. UCX grew Pseudomonas . ID has been consulted and pending eval for further Recommendations about Antibiotics. USG Abdomen was unremarkable.     Patient was seen at the bedside, stable, wants to go home but no issues.     Constitutional: well-developed; well-groomed; well-nourished  Eyes :conjunctiva clear   Neck supple; no JVD  Back normal shape; ROM intact  Respiratory normal; airway patent; breath sounds equal; good air movement  Cardiovascular; regular rate and rhythm  no rub  no murmur  normal PMI  Gastrointestinal :soft, Nontender, No distension, NBS  Neurological: alert and oriented x 3; responds to pain; responds to verbal commands; sensation intact; deep reflexes intact  Skin; warm and dry; color normal  Musculoskeletal ::normal; ROM intact; no joint swelling; no joint erythema; no joint warmth    
Admit Date: 21 (2d)    Chief Complaint:  Patient is a 65y old  Female who presents with a chief complaint of pyelonephritis (01 Mar 2021 00:00)      Past Medical and Surgical History:  PAST MEDICAL & SURGICAL HISTORY:  Pancreatic lesion    Hyperlipidemia    Diabetes    Hypertension    Polymyalgia rheumatica        Current Medications:  MEDICATIONS  (STANDING):  amLODIPine   Tablet 10 milliGRAM(s) Oral daily  aspirin enteric coated 81 milliGRAM(s) Oral daily  ATENolol  Tablet 50 milliGRAM(s) Oral daily  atorvastatin 40 milliGRAM(s) Oral at bedtime  chlorhexidine 4% Liquid 1 Application(s) Topical <User Schedule>  cyanocobalamin 1000 MICROGram(s) Oral daily  enoxaparin Injectable 40 milliGRAM(s) SubCutaneous daily  insulin glargine Injectable (LANTUS) 5 Unit(s) SubCutaneous at bedtime  insulin lispro (ADMELOG) corrective regimen sliding scale   SubCutaneous three times a day before meals  losartan 100 milliGRAM(s) Oral daily  meropenem  IVPB 1000 milliGRAM(s) IV Intermittent every 8 hours  mycophenolate mofetil 500 milliGRAM(s) Oral two times a day  tamsulosin 0.4 milliGRAM(s) Oral at bedtime    MEDICATIONS  (PRN):  acetaminophen   Tablet .. 650 milliGRAM(s) Oral every 6 hours PRN Moderate Pain (4 - 6)  ketorolac   Injectable 15 milliGRAM(s) IV Push every 6 hours PRN Severe Pain (7 - 10)      Interval History:  No acute events overnight. No complaints at this time.    Vital Signs:  T(F): 97.3 (21 @ 05:25), Max: 100.2 (21 @ 15:36)  HR: 76 (21 @ 05:25) (73 - 96)  BP: 129/73 (21 @ 05:25) (127/81 - 145/82)  RR: 18 (21 @ 05:25) (18 - 18)  SpO2: 97% (21 @ 13:00) (97% - 100%)  CAPILLARY BLOOD GLUCOSE      POCT Blood Glucose.: 132 mg/dL (02 Mar 2021 11:05)  POCT Blood Glucose.: 98 mg/dL (02 Mar 2021 07:22)  POCT Blood Glucose.: 119 mg/dL (01 Mar 2021 21:04)  POCT Blood Glucose.: 98 mg/dL (01 Mar 2021 16:28)  POCT Blood Glucose.: 95 mg/dL (01 Mar 2021 11:52)      Physical Exam:  General: Not in distress.   HEENT: Moist mucus membranes. PERRLA.  Cardio: Regular rate and rhythm, S1, S2, no murmur, rub, or gallop.  Pulm: Clear to auscultation bilaterally. No wheezing, rales, or rhonchi.  Abdomen: Soft, non-tender, non-distended. Normoactive bowel sounds.  Extremities: No cyanosis or edema bilaterally.   Neuro: Alert    Labs and Imaging:  CBC Full  -  ( 02 Mar 2021 05:39 )  WBC Count : 3.73 K/uL  RBC Count : 3.40 M/uL  Hemoglobin : 11.2 g/dL  Hematocrit : 33.9 %  Platelet Count - Automated : 415 K/uL  Mean Cell Volume : 99.7 fL  Mean Cell Hemoglobin : 32.9 pg  Mean Cell Hemoglobin Concentration : 33.0 g/dL  Auto Neutrophil # : 1.96 K/uL  Auto Lymphocyte # : 1.33 K/uL  Auto Monocyte # : 0.25 K/uL  Auto Eosinophil # : 0.15 K/uL  Auto Basophil # : 0.03 K/uL  Auto Neutrophil % : 52.5 %  Auto Lymphocyte % : 35.7 %  Auto Monocyte % : 6.7 %  Auto Eosinophil % : 4.0 %  Auto Basophil % : 0.8 %    RDW: 13.6      BMP: 21 @ 05:39  138 | 101 | 21   -----------------< 112  4.2  | 25 | 1.1  eGFR(AA): 60, eGFR (non-AA): 52  Ca 9.6, Mg 1.8, P --    LFTs: 21 @ 05:39  TP  7.2  | 4.0 Alb   ---------------  TB  0.4  | --  DB   ---------------  ALT 78  | 68  AST            ^          126 ALK  LFTs: 21 @ 05:42  TP  7.2  | 4.1 Alb   ---------------  TB  0.5  | <0.2 DB   ---------------  ALT 71  | 56  AST            ^          122 ALK  LFTs: 21 @ 16:24  TP  7.5  | 4.3 Alb   ---------------  TB  0.3  | --  DB   ---------------  ALT 67  | 51  AST            ^          121 ALK      LFTs: 21 @ 05:39  Ca  9.6  | 68 AST   -----------------  TP  7.2  | 78 ALT  -----------------  Alb 4.0  | 126 ALK          ^        0.4         TB      Cardiac Enzymes:    Urinalysis:  Urinalysis Basic - ( 01 Mar 2021 07:42 )    Color: Light Yellow / Appearance: Clear / S.018 / pH: x  Gluc: x / Ketone: Negative  / Bili: Negative / Urobili: <2 mg/dL   Blood: x / Protein: 30 mg/dL / Nitrite: Negative   Leuk Esterase: Negative / RBC: 6 /HPF / WBC 3 /HPF   Sq Epi: x / Non Sq Epi: 1 /HPF / Bacteria: Negative      Cultures:     (collected 21 @ 16:24)  Source: .Urine Clean Catch (Midstream)  Preliminary Report:    50,000 - 99,000 CFU/mL Pseudomonas aeruginosa    <10,000 CFU/ml Normal Urogenital linda present      Home Medications:  Home Medications:  amLODIPine 10 mg oral tablet: 1 tab(s) orally once a day (2021 23:32)  aspirin 81 mg oral tablet: 1 tab(s) orally once a day (2021 23:32)  atenolol 50 mg oral tablet: 1 tab(s) orally once a day (2021 23:32)  atorvastatin 40 mg oral tablet: 1 tab(s) orally once a day (2021 23:32)  CoQ10 300 mg oral capsule: 1 cap(s) orally once a day (2021 23:32)  losartan 100 mg oral tablet: 1 tab(s) orally once a day (2021 23:32)  metFORMIN 1000 mg oral tablet: 1 tab(s) orally 2 times a day (2021 23:32)  mycophenolate mofetil 500 mg oral tablet: 1 tab(s) orally once a day (2021 23:32)  Vitamin B12 1000 mcg oral tablet: 2 tab(s) orally once a day (2021 23:32)

## 2021-03-03 NOTE — DISCHARGE NOTE PROVIDER - HOSPITAL COURSE
66yo female with PMH of polymyalgia rheumatica, diabetes mellitus, hypertension, and hyperlipidemia who presented to the hospital complaining of right flank for the past two weeks associated with hematuria. On floor, patient was continued on antibiotics. Urine culture grew Pseudomonas . ID has been consulted and pending eval for further Recommendations about Antibiotics. USG Abdomen was unremarkable.     Pyelonephritis   secondary Pseudomonas:  -Was on merrem. Switched to Cipro as per ID  -Cipro 750 mg PO twice a day x 6 more days     Transaminitis  Trending down  USG Abdomen : Liver: 1.1 cm x 1 cm cyst within the right lobe of the liver.  RUQ US: Contracted gallbladder. Otherwise unremarkable.    pancreatic cysts   Incidental finding on CT:  < from: CT Abdomen and Pelvis w/ IV Cont (02.28.21 @ 19:02) >    Unchanged nonobstructing left lower pole calculus.    Pancreatic body 0.7 cm cystic lesion and pancreatic neck 0.8 cm cystic lesion. Findings likely represent side branch IPMN, consider correlation with nonemergent MRI/MRCP as clinically warranted.    < end of copied text >    follow up with outpatient GI and need for MRI pancreatic protocol    HTN:  Continue home meds    DM:  Continue home meds    HLD  on statin

## 2021-03-03 NOTE — DISCHARGE NOTE PROVIDER - NSDCMRMEDTOKEN_GEN_ALL_CORE_FT
amLODIPine 10 mg oral tablet: 1 tab(s) orally once a day  aspirin 81 mg oral tablet: 1 tab(s) orally once a day  atenolol 50 mg oral tablet: 1 tab(s) orally once a day  atorvastatin 40 mg oral tablet: 1 tab(s) orally once a day  ciprofloxacin 750 mg oral tablet: 1 tab(s) orally 2 times a day for 6 days  CoQ10 300 mg oral capsule: 1 cap(s) orally once a day  losartan 100 mg oral tablet: 1 tab(s) orally once a day  metFORMIN 1000 mg oral tablet: 1 tab(s) orally 2 times a day  mycophenolate mofetil 500 mg oral tablet: 1 tab(s) orally once a day  Vitamin B12 1000 mcg oral tablet: 2 tab(s) orally once a day

## 2021-03-03 NOTE — CONSULT NOTE ADULT - ASSESSMENT
66yo female with PMH of polymyalgia rheumatica, diabetes mellitus, hypertension, and hyperlipidemia who presented to the hospital complaining of right flank for the past two weeks associated with hematuria. The patient had gone to urgent care and was diagnosed with a urinary tract infection. She was prescribed a 7-day course of cephalexin 500mg, which did not alleviate her symptoms. She was seen by her rheumatologist recently, who found WBC on urinalysis without any RBC. She also endorses nausea and some "dark spots in her urine"  In the ED,  Tmax 100.2F,    IMPRESSION;  Acute pyelonephritis secondary to P aeruginosa  No pyuria ( possibly as she was on ABx for 7 days )  CT/ US results noted  WBC 3.2    RECOMMENDATIONS;  po [id number] mg q12h for 6 more days  recall prn please

## 2021-03-03 NOTE — CONSULT NOTE ADULT - SUBJECTIVE AND OBJECTIVE BOX
ANTONIA CASTELLANOS  65y, Female  Allergy: No Known Allergies      All historical available data reviewed.    HPI:  Patient is a 66yo female with PMH of polymyalgia rheumatica, diabetes mellitus, hypertension, and hyperlipidemia who presented to the hospital complaining of right flank for the past two weeks associated with hematuria. The patient had gone to urgent care and was diagnosed with a urinary tract infection. She was prescribed a 7-day course of cephalexin 500mg, which did not alleviate her symptoms. She was seen by her rheumatologist recently, who found WBC on urinalysis without any RBC. She also endorses nausea and some "dark spots in her urine"    In the ED, vital signs were Tmax 100.2F, HR 96, /83, RR 18, SpO2 100% on room air. Labs were significant for hemoglobin 11, , alkaline phosphatase 121, AST 51, and ALT 67. CT abdomen/pelvis revealed no acute intra-abdominal pathology, but also showed an unchanged left lower pole non-obstructing calculus measuring 1.1x0.6cm and 7mm and 8mm cystic lesions in the pancreatic head and neck, respectively. Urinalysis was positive for large leukocyte esterase, small blood, and 20 WBC. Patient was given 1L bolus of LR, ketorolac 15mg IV, ondansetron 4mg IV, and ceftriaxone 1g IV.  (01 Mar 2021 00:00)    FAMILY HISTORY:    PAST MEDICAL & SURGICAL HISTORY:  Pancreatic lesion    Hyperlipidemia    Diabetes    Hypertension    Polymyalgia rheumatica          VITALS:  T(F): 97.3, Max: 98.4 (03-03-21 @ 05:56)  HR: 81  BP: 113/67  RR: 18Vital Signs Last 24 Hrs  T(C): 36.3 (03 Mar 2021 12:59), Max: 36.9 (03 Mar 2021 05:56)  T(F): 97.3 (03 Mar 2021 12:59), Max: 98.4 (03 Mar 2021 05:56)  HR: 81 (03 Mar 2021 12:59) (81 - 88)  BP: 113/67 (03 Mar 2021 12:59) (113/67 - 130/66)  BP(mean): --  RR: 18 (03 Mar 2021 12:59) (18 - 18)  SpO2: --    TESTS & MEASUREMENTS:                        10.7   3.23  )-----------( 392      ( 03 Mar 2021 05:57 )             33.3     03-03    139  |  103  |  25<H>  ----------------------------<  111<H>  4.8   |  26  |  1.1    Ca    9.6      03 Mar 2021 05:57  Mg     1.8     03-02    TPro  6.9  /  Alb  4.0  /  TBili  0.3  /  DBili  x   /  AST  48<H>  /  ALT  68<H>  /  AlkPhos  112  03-03    LIVER FUNCTIONS - ( 03 Mar 2021 05:57 )  Alb: 4.0 g/dL / Pro: 6.9 g/dL / ALK PHOS: 112 U/L / ALT: 68 U/L / AST: 48 U/L / GGT: x             Culture - Urine (collected 02-28-21 @ 16:24)  Source: .Urine Clean Catch (Midstream)  Final Report (03-03-21 @ 10:33):    50,000 - 99,000 CFU/mL Pseudomonas aeruginosa    <10,000 CFU/ml Normal Urogenital linda present  Organism: Pseudomonas aeruginosa (03-03-21 @ 10:33)  Organism: Pseudomonas aeruginosa (03-03-21 @ 10:33)      -  Amikacin: S <=16      -  Aztreonam: S <=4      -  Cefepime: S <=2      -  Ceftazidime: S 4      -  Ciprofloxacin: S <=0.25      -  Gentamicin: S 4      -  Imipenem: S <=1      -  Levofloxacin: S <=0.5      -  Meropenem: S <=1      -  Piperacillin/Tazobactam: S <=8      -  Tobramycin: S <=2      Method Type: ISABEL            RADIOLOGY & ADDITIONAL TESTS:  Personal review of radiological diagnostics performed  Echo and EKG results noted when applicable.     MEDICATIONS:  acetaminophen   Tablet .. 650 milliGRAM(s) Oral every 6 hours PRN  amLODIPine   Tablet 10 milliGRAM(s) Oral daily  aspirin enteric coated 81 milliGRAM(s) Oral daily  ATENolol  Tablet 50 milliGRAM(s) Oral daily  atorvastatin 40 milliGRAM(s) Oral at bedtime  chlorhexidine 4% Liquid 1 Application(s) Topical <User Schedule>  cyanocobalamin 1000 MICROGram(s) Oral daily  enoxaparin Injectable 40 milliGRAM(s) SubCutaneous daily  insulin glargine Injectable (LANTUS) 5 Unit(s) SubCutaneous at bedtime  insulin lispro (ADMELOG) corrective regimen sliding scale   SubCutaneous three times a day before meals  ketorolac   Injectable 15 milliGRAM(s) IV Push every 6 hours PRN  losartan 100 milliGRAM(s) Oral daily  meropenem  IVPB 1000 milliGRAM(s) IV Intermittent every 8 hours  mycophenolate mofetil 500 milliGRAM(s) Oral two times a day  tamsulosin 0.4 milliGRAM(s) Oral at bedtime      ANTIBIOTICS:  meropenem  IVPB 1000 milliGRAM(s) IV Intermittent every 8 hours

## 2021-03-03 NOTE — CHART NOTE - NSCHARTNOTEFT_GEN_A_CORE
Was notified by MD that patient is medically stable for discharge home  Patient was transitioned to Cipro 750 mg PO BID x 6 more days. Sent to pharmacy     Patient was instructed to f/u with:  -GI for pancreatic cysts  -Rheumatology for hx of polymyalgia rheumatica   -PMD for HTN, DM    Patient is feeling well and is agreeable to d/c home  VSS. No complaints.

## 2021-03-05 PROBLEM — M35.3 POLYMYALGIA RHEUMATICA: Chronic | Status: ACTIVE | Noted: 2021-02-28

## 2021-03-05 PROBLEM — I10 ESSENTIAL (PRIMARY) HYPERTENSION: Chronic | Status: ACTIVE | Noted: 2021-02-28

## 2021-03-05 PROBLEM — K86.9 DISEASE OF PANCREAS, UNSPECIFIED: Chronic | Status: ACTIVE | Noted: 2021-02-28

## 2021-03-05 PROBLEM — E78.5 HYPERLIPIDEMIA, UNSPECIFIED: Chronic | Status: ACTIVE | Noted: 2021-02-28

## 2021-03-05 PROBLEM — E11.9 TYPE 2 DIABETES MELLITUS WITHOUT COMPLICATIONS: Chronic | Status: ACTIVE | Noted: 2021-02-28

## 2021-03-12 ENCOUNTER — APPOINTMENT (OUTPATIENT)
Dept: PLASTIC SURGERY | Facility: CLINIC | Age: 66
End: 2021-03-12
Payer: MEDICARE

## 2021-03-12 PROCEDURE — 99072 ADDL SUPL MATRL&STAF TM PHE: CPT

## 2021-03-12 PROCEDURE — 64721 CARPAL TUNNEL SURGERY: CPT | Mod: RT

## 2021-03-12 NOTE — PROCEDURE
[FreeTextEntry6] : Patient is a 65 year old female with right carpal tunnel syndrome.  \par \par Local anesthetic was administered using 1% lidocaine with epinephrine.  Area was prepped and draped in usual fashion after an appropriate dwell period for the local to have its intended effect.  Skin incision was made with a scalpel.  Superficial palmar fascia was divided and the transverse carpal ligament was visualized.  Motor branch of median nerve was not seen.  The TCL was divided.  The median nerve was visible and showed signs of chronic compression.  Hemostasis was confirmed and the area was irrigated copiously.  4-0 Nylon sutures were used for closure.  Area cleansed with normal saline.  Sterile dressing applied along with a compression wrap and a soft splint (4x4 gauze, kerlix, abdominal pad, Coban).\par \par Patient tolerated procedure well and understands post-op instructions.\par \par Due to COVID 19, pre-visit patient instructions were explained to the patient and their symptoms were checked upon arrival.  \par Masks were used by the health care providers and staff and the examination room was cleaned after the patient visit was completed.\par

## 2021-03-15 DIAGNOSIS — E11.9 TYPE 2 DIABETES MELLITUS WITHOUT COMPLICATIONS: ICD-10-CM

## 2021-03-15 DIAGNOSIS — K76.89 OTHER SPECIFIED DISEASES OF LIVER: ICD-10-CM

## 2021-03-15 DIAGNOSIS — D53.9 NUTRITIONAL ANEMIA, UNSPECIFIED: ICD-10-CM

## 2021-03-15 DIAGNOSIS — E78.5 HYPERLIPIDEMIA, UNSPECIFIED: ICD-10-CM

## 2021-03-15 DIAGNOSIS — M35.3 POLYMYALGIA RHEUMATICA: ICD-10-CM

## 2021-03-15 DIAGNOSIS — K86.2 CYST OF PANCREAS: ICD-10-CM

## 2021-03-15 DIAGNOSIS — E53.8 DEFICIENCY OF OTHER SPECIFIED B GROUP VITAMINS: ICD-10-CM

## 2021-03-15 DIAGNOSIS — R74.01 ELEVATION OF LEVELS OF LIVER TRANSAMINASE LEVELS: ICD-10-CM

## 2021-03-15 DIAGNOSIS — Z79.84 LONG TERM (CURRENT) USE OF ORAL HYPOGLYCEMIC DRUGS: ICD-10-CM

## 2021-03-15 DIAGNOSIS — N10 ACUTE PYELONEPHRITIS: ICD-10-CM

## 2021-03-15 DIAGNOSIS — Z79.899 OTHER LONG TERM (CURRENT) DRUG THERAPY: ICD-10-CM

## 2021-03-15 DIAGNOSIS — N20.0 CALCULUS OF KIDNEY: ICD-10-CM

## 2021-03-15 DIAGNOSIS — B96.5 PSEUDOMONAS (AERUGINOSA) (MALLEI) (PSEUDOMALLEI) AS THE CAUSE OF DISEASES CLASSIFIED ELSEWHERE: ICD-10-CM

## 2021-03-15 DIAGNOSIS — I10 ESSENTIAL (PRIMARY) HYPERTENSION: ICD-10-CM

## 2021-03-19 ENCOUNTER — APPOINTMENT (OUTPATIENT)
Dept: GASTROENTEROLOGY | Facility: CLINIC | Age: 66
End: 2021-03-19
Payer: MEDICARE

## 2021-03-19 ENCOUNTER — OUTPATIENT (OUTPATIENT)
Dept: OUTPATIENT SERVICES | Facility: HOSPITAL | Age: 66
LOS: 1 days | Discharge: HOME | End: 2021-03-19

## 2021-03-19 VITALS
WEIGHT: 168 LBS | BODY MASS INDEX: 27 KG/M2 | OXYGEN SATURATION: 96 % | HEIGHT: 66 IN | TEMPERATURE: 98.3 F | DIASTOLIC BLOOD PRESSURE: 84 MMHG | SYSTOLIC BLOOD PRESSURE: 158 MMHG | HEART RATE: 82 BPM

## 2021-03-19 DIAGNOSIS — K86.2 CYST OF PANCREAS: ICD-10-CM

## 2021-03-19 DIAGNOSIS — K76.89 OTHER SPECIFIED DISEASES OF LIVER: ICD-10-CM

## 2021-03-19 DIAGNOSIS — R74.01 ELEVATION OF LEVELS OF LIVER TRANSAMINASE LEVELS: ICD-10-CM

## 2021-03-19 DIAGNOSIS — Z12.11 ENCOUNTER FOR SCREENING FOR MALIGNANT NEOPLASM OF COLON: ICD-10-CM

## 2021-03-19 DIAGNOSIS — Z12.12 ENCOUNTER FOR SCREENING FOR MALIGNANT NEOPLASM OF COLON: ICD-10-CM

## 2021-03-19 PROCEDURE — 99214 OFFICE O/P EST MOD 30 MIN: CPT | Mod: GC

## 2021-03-19 NOTE — END OF VISIT
[] : Fellow [FreeTextEntry3] : LFTs - likely up 2/2 medications in the hospital, improving during hospitalization. repeat today. CT shows liver and pancreas cyst - will perform MRI to assess cysts.

## 2021-03-19 NOTE — HISTORY OF PRESENT ILLNESS
[Hospitalization] : was hospitalized [Heartburn] : denies heartburn [Nausea] : denies nausea [Vomiting] : denies vomiting [Diarrhea] : denies diarrhea [Constipation] : denies constipation [Yellow Skin Or Eyes (Jaundice)] : denies jaundice [Abdominal Pain] : denies abdominal pain [Abdominal Swelling] : denies abdominal swelling [Rectal Pain] : denies rectal pain [Kidney Stone] : kidney stone [_________] : Performed [unfilled] [Wt Gain ___ Lbs] : no recent weight gain [Wt Loss ___ Lbs] : no recent weight loss [GERD] : no gastroesophageal reflux disease [Hiatus Hernia] : no hiatus hernia [Peptic Ulcer Disease] : no peptic ulcer disease [Pancreatitis] : no pancreatitis [Cholelithiasis] : no cholelithiasis [Irritable Bowel Syndrome] : no irritable bowel syndrome [Diverticulitis] : no diverticulitis [Alcohol Abuse] : no alcohol abuse [Malignancy] : no malignancy [Abdominal Surgery] : no abdominal surgery [Appendectomy] : no appendectomy [Cholecystectomy] : no cholecystectomy [de-identified] : for nephrolithiasis + pyelonephritis  [de-identified] : 64 YO F w/PMHx of Polymyalgia Rheumatica, Trigger Finger x 4, B/L Carpal Tunnel Syndrome s/p R Flexor Retinaculum Release surgery, Chronic anemia baseline hgb 10.5, DM, HTN, DLD, who was recently hospitalized with nephrolithiasis + R pyelonephritis, treated with ABX, during course of hospitalization noted to have mild transaminitis as well as incidentally found to have small liver cyst and 2 small pancreatic cysts on CT A/P. Pt denies recent weight loss, jaundice, fevers, diarrhea, constipation, bloody stools, nausea/vomiting, endorses recent early satiety and decreased appetite.  [de-identified] : Pancreatic Body 0.7cm cystic lesion, pancreatic neck 0.8cm cystic lesion \par  [de-identified] : 2013 [de-identified] : Liver 1.1 x 1.0cm cyst in R lob , CBD 3mm

## 2021-03-19 NOTE — PHYSICAL EXAM
[General Appearance - Alert] : alert [General Appearance - In No Acute Distress] : in no acute distress [General Appearance - Well Nourished] : well nourished [General Appearance - Well Developed] : well developed [Sclera] : the sclera and conjunctiva were normal [PERRL With Normal Accommodation] : pupils were equal in size, round, and reactive to light [Extraocular Movements] : extraocular movements were intact [Outer Ear] : the ears and nose were normal in appearance [Hearing Threshold Finger Rub Not [place]] : hearing was normal [Neck Appearance] : the appearance of the neck was normal [Respiration, Rhythm And Depth] : normal respiratory rhythm and effort [Exaggerated Use Of Accessory Muscles For Inspiration] : no accessory muscle use [Auscultation Breath Sounds / Voice Sounds] : lungs were clear to auscultation bilaterally [Heart Rate And Rhythm] : heart rate was normal and rhythm regular [Heart Sounds] : normal S1 and S2 [Bowel Sounds] : normal bowel sounds [Abdomen Soft] : soft [Abdomen Tenderness] : non-tender [Abdomen Mass (___ Cm)] : no abdominal mass palpated [Abdomen Hernia] : no hernia was discovered [No CVA Tenderness] : no ~M costovertebral angle tenderness [No Spinal Tenderness] : no spinal tenderness [Abnormal Walk] : normal gait [Nail Clubbing] : no clubbing  or cyanosis of the fingernails [Musculoskeletal - Swelling] : no joint swelling seen [Skin Turgor] : normal skin turgor [] : no rash [Skin Lesions] : no skin lesions [No Focal Deficits] : no focal deficits [Oriented To Time, Place, And Person] : oriented to person, place, and time [Affect] : the affect was normal

## 2021-03-19 NOTE — ASSESSMENT
[FreeTextEntry1] : 64 YO F w/above medical hx presenting post hospitalization for evaluation of transaminitis, liver cyst and pancreatic cysts x 2.\par \par # Mild Transaminitis - most likely medication induced during hosp.\par - repeat CMP\par \par # Liver Cyst + Pancreatic Cysts\par - US Liver unremarkable no evidence of obstructive hepatobiliary process\par - MR Abdomen w/w/out contrast\par \par # Screening Colonoscopy\par - last screened in 2013, agrees to repeat \par - CBC, CMP

## 2021-03-20 DIAGNOSIS — R74.01 ELEVATION OF LEVELS OF LIVER TRANSAMINASE LEVELS: ICD-10-CM

## 2021-03-20 DIAGNOSIS — Z12.11 ENCOUNTER FOR SCREENING FOR MALIGNANT NEOPLASM OF COLON: ICD-10-CM

## 2021-03-20 DIAGNOSIS — K76.89 OTHER SPECIFIED DISEASES OF LIVER: ICD-10-CM

## 2021-03-20 DIAGNOSIS — K86.2 CYST OF PANCREAS: ICD-10-CM

## 2021-03-22 ENCOUNTER — NON-APPOINTMENT (OUTPATIENT)
Age: 66
End: 2021-03-22

## 2021-03-24 RX ORDER — POLYETHYLENE GLYCOL 3350 17 G/17G
17 POWDER, FOR SOLUTION ORAL
Qty: 1 | Refills: 0 | Status: ACTIVE | COMMUNITY
Start: 2021-03-23 | End: 1900-01-01

## 2021-03-26 ENCOUNTER — APPOINTMENT (OUTPATIENT)
Dept: PLASTIC SURGERY | Facility: CLINIC | Age: 66
End: 2021-03-26
Payer: MEDICARE

## 2021-03-26 PROCEDURE — 99024 POSTOP FOLLOW-UP VISIT: CPT

## 2021-03-26 NOTE — PHYSICAL EXAM
[de-identified] : NAD [de-identified] : Right volar hand incisions healing well, c/d/i with swelling as expected, no erythema,  limited ROM of 4th digit at PIP joint

## 2021-03-26 NOTE — HISTORY OF PRESENT ILLNESS
[FreeTextEntry1] : 64 yo F with h/o left index and middle trigger finger release doing well who presents with recurrent triggering of the right index and middle finger. Patient is s/p Kenalog injection into right index x1 in 2016 and 3rd TF in 8/2016 and 6/2017 with recurrent triggering. \par \par Patient is now 4 months s/p release of right 2nd and 3rd trigger finger. Presents for follow up.\par \par Interval hx (9/22/20). Patient presents today for f/u 2 yrs s/p right index and middle TFR c/o right hand stiffness and pain of the 4th digit now for the past few months causing limited ROM. Denies any hand trauma. \par \par Interval hx (11/3/20). Patient presents today for f/u to discuss X-ray findings. \par \par Interval hx (12/15/21). Pt here to discuss EMG results. Test reveals severe bl CTS.\par \par Interval hx (3/26/21). Patient presents today POD#14 s/p right CTR. Doing well and reporting improvement in finger numbness but concerned with limited ROM of right 4th digit. Denies any f/c or bleeding.

## 2021-03-26 NOTE — ASSESSMENT
[FreeTextEntry1] : 66 yo F s/p release of right 2nd and 3rd trigger finger 8/2018 now with new 4th TF s/p 5mg kenalog into right 4th digit. Also with severe bilateral CTS.\par \par Now POD#14 s/p Rt CTR. Doing well. \par \par - sutures removed, dressing changed\par - hand rest and elevation\par - OT for ROM\par - f/u 4-6 weeks after OT\par \par Due to COVID 19, pre-visit patient instructions were explained to the patient and their symptoms were checked upon arrival.  \par Masks were used by the health care providers and staff and the examination room was cleaned after the patient visit was completed.

## 2021-04-14 ENCOUNTER — OUTPATIENT (OUTPATIENT)
Dept: OUTPATIENT SERVICES | Facility: HOSPITAL | Age: 66
LOS: 1 days | Discharge: HOME | End: 2021-04-14
Payer: MEDICARE

## 2021-04-14 ENCOUNTER — RESULT REVIEW (OUTPATIENT)
Age: 66
End: 2021-04-14

## 2021-04-14 DIAGNOSIS — K86.2 CYST OF PANCREAS: ICD-10-CM

## 2021-04-14 PROCEDURE — 74183 MRI ABD W/O CNTR FLWD CNTR: CPT | Mod: 26

## 2021-04-18 ENCOUNTER — LABORATORY RESULT (OUTPATIENT)
Age: 66
End: 2021-04-18

## 2021-04-18 ENCOUNTER — OUTPATIENT (OUTPATIENT)
Dept: OUTPATIENT SERVICES | Facility: HOSPITAL | Age: 66
LOS: 1 days | Discharge: HOME | End: 2021-04-18

## 2021-04-18 DIAGNOSIS — Z11.59 ENCOUNTER FOR SCREENING FOR OTHER VIRAL DISEASES: ICD-10-CM

## 2021-04-21 ENCOUNTER — TRANSCRIPTION ENCOUNTER (OUTPATIENT)
Age: 66
End: 2021-04-21

## 2021-04-21 ENCOUNTER — OUTPATIENT (OUTPATIENT)
Dept: OUTPATIENT SERVICES | Facility: HOSPITAL | Age: 66
LOS: 1 days | Discharge: HOME | End: 2021-04-21
Payer: MEDICARE

## 2021-04-21 VITALS
RESPIRATION RATE: 18 BRPM | HEIGHT: 66 IN | TEMPERATURE: 98 F | HEART RATE: 72 BPM | SYSTOLIC BLOOD PRESSURE: 155 MMHG | WEIGHT: 177.03 LBS | DIASTOLIC BLOOD PRESSURE: 99 MMHG

## 2021-04-21 VITALS
SYSTOLIC BLOOD PRESSURE: 130 MMHG | DIASTOLIC BLOOD PRESSURE: 84 MMHG | TEMPERATURE: 98 F | OXYGEN SATURATION: 100 % | RESPIRATION RATE: 17 BRPM | HEART RATE: 78 BPM

## 2021-04-21 DIAGNOSIS — Z98.890 OTHER SPECIFIED POSTPROCEDURAL STATES: Chronic | ICD-10-CM

## 2021-04-21 DIAGNOSIS — Z90.710 ACQUIRED ABSENCE OF BOTH CERVIX AND UTERUS: Chronic | ICD-10-CM

## 2021-04-21 PROCEDURE — 45378 DIAGNOSTIC COLONOSCOPY: CPT | Mod: 53

## 2021-04-21 RX ORDER — PREGABALIN 225 MG/1
2 CAPSULE ORAL
Qty: 0 | Refills: 0 | DISCHARGE

## 2021-04-21 RX ORDER — UBIDECARENONE 100 MG
1 CAPSULE ORAL
Qty: 0 | Refills: 0 | DISCHARGE

## 2021-04-21 RX ORDER — ATORVASTATIN CALCIUM 80 MG/1
1 TABLET, FILM COATED ORAL
Qty: 0 | Refills: 0 | DISCHARGE

## 2021-04-21 RX ORDER — METFORMIN HYDROCHLORIDE 850 MG/1
1 TABLET ORAL
Qty: 0 | Refills: 0 | DISCHARGE

## 2021-04-21 RX ORDER — MYCOPHENOLATE MOFETIL 250 MG/1
1 CAPSULE ORAL
Qty: 0 | Refills: 0 | DISCHARGE

## 2021-04-21 RX ORDER — LOSARTAN POTASSIUM 100 MG/1
1 TABLET, FILM COATED ORAL
Qty: 0 | Refills: 0 | DISCHARGE

## 2021-04-21 RX ORDER — ASPIRIN/CALCIUM CARB/MAGNESIUM 324 MG
1 TABLET ORAL
Qty: 0 | Refills: 0 | DISCHARGE

## 2021-04-21 RX ORDER — AMLODIPINE BESYLATE 2.5 MG/1
1 TABLET ORAL
Qty: 0 | Refills: 0 | DISCHARGE

## 2021-04-21 RX ORDER — ATENOLOL 25 MG/1
1 TABLET ORAL
Qty: 0 | Refills: 0 | DISCHARGE

## 2021-04-21 NOTE — CHART NOTE - NSCHARTNOTEFT_GEN_A_CORE
PACU ANESTHESIA ADMISSION NOTE      Procedure:   Post op diagnosis:      ____  Intubated  TV:______       Rate: ______      FiO2: ______    ___x_  Patent Airway  x  ____  Full return of protective reflexes    __x__  Full recovery from anesthesia / back to baseline     Vitals:   T: 98.2          R:   14               BP: 131/78                 Sat:   98%                P: 78      Mental Status:  __x__ Awake   ___x__ Alert   _____ Drowsy   _____ Sedated    Nausea/Vomiting:  _x___ NO  ______Yes,   See Post - Op Orders          Pain Scale (0-10):  __0___    Treatment: ____ None    ____ See Post - Op/PCA Orders    Post - Operative Fluids:   ____ Oral   ____ See Post - Op Orders    Plan: Discharge:   __x__Home       _____Floor     _____Critical Care    _____  Other:_________________    Comments: Pt awake and alert. No complaints. Discharge home when appropriate.

## 2021-04-21 NOTE — ASU PATIENT PROFILE, ADULT - PSH
H/O abdominal hysterectomy    History of carpal tunnel release  right  S/P trigger finger release  x3

## 2021-04-21 NOTE — PRE-ANESTHESIA EVALUATION ADULT - NSANTHOSAYNRD_GEN_A_CORE
No. NAVNEET screening performed.  STOP BANG Legend: 0-2 = LOW Risk; 3-4 = INTERMEDIATE Risk; 5-8 = HIGH Risk

## 2021-04-28 ENCOUNTER — OUTPATIENT (OUTPATIENT)
Dept: OUTPATIENT SERVICES | Facility: HOSPITAL | Age: 66
LOS: 1 days | Discharge: HOME | End: 2021-04-28

## 2021-04-28 DIAGNOSIS — Z98.890 OTHER SPECIFIED POSTPROCEDURAL STATES: Chronic | ICD-10-CM

## 2021-04-28 DIAGNOSIS — G56.00 CARPAL TUNNEL SYNDROME, UNSPECIFIED UPPER LIMB: ICD-10-CM

## 2021-04-28 DIAGNOSIS — Z90.710 ACQUIRED ABSENCE OF BOTH CERVIX AND UTERUS: Chronic | ICD-10-CM

## 2021-04-29 ENCOUNTER — APPOINTMENT (OUTPATIENT)
Dept: PLASTIC SURGERY | Facility: CLINIC | Age: 66
End: 2021-04-29
Payer: MEDICARE

## 2021-04-29 PROCEDURE — 99024 POSTOP FOLLOW-UP VISIT: CPT

## 2021-04-29 NOTE — ASSESSMENT
[FreeTextEntry1] : 64 yo F s/p release of right 2nd and 3rd trigger finger 8/2018 now with new 4th TF s/p 5mg kenalog into right 4th digit. Also with severe bilateral CTS.\par \par Now POD#14 s/p Rt CTR. Doing well. \par \par - sutures removed, dressing changed\par - hand rest and elevation\par - OT for ROM\par - f/u 4-6 weeks after OT\par \par Due to COVID 19, pre-visit patient instructions were explained to the patient and their symptoms were checked upon arrival.  \par Masks were used by the health care providers and staff and the examination room was cleaned after the patient visit was completed.\par \par pt very happy after right CTR--sxs better\par has intermittent right 4th triggering (few times per week)  had one prior injection in 2020\par offered second (and final) steroid injection but she deferred for now\par \par also has left sever CTS and I recommended CTR\par \par she will consider and wants to recover more from rigth CTR, from which she is doing very well\par \par no issues\par \par f/u 6 months\par \par Due to COVID 19, pre-visit patient instructions were explained to the patient and their symptoms were checked upon arrival.  \par Masks were used by the health care providers and staff and the examination room was cleaned after the patient visit was completed.\par

## 2021-04-29 NOTE — HISTORY OF PRESENT ILLNESS
[FreeTextEntry1] : 66 yo F with h/o left index and middle trigger finger release doing well who presents with recurrent triggering of the right index and middle finger. Patient is s/p Kenalog injection into right index x1 in 2016 and 3rd TF in 8/2016 and 6/2017 with recurrent triggering. \par \par Patient is now 4 months s/p release of right 2nd and 3rd trigger finger. Presents for follow up.\par \par Interval hx (9/22/20). Patient presents today for f/u 2 yrs s/p right index and middle TFR c/o right hand stiffness and pain of the 4th digit now for the past few months causing limited ROM. Denies any hand trauma. \par \par Interval hx (11/3/20). Patient presents today for f/u to discuss X-ray findings. \par \par Interval hx (12/15/21). Pt here to discuss EMG results. Test reveals severe bl CTS.\par \par Interval hx (3/26/21). Patient presents today POD#14 s/p right CTR. Doing well and reporting improvement in finger numbness but concerned with limited ROM of right 4th digit. Denies any f/c or bleeding.

## 2021-04-29 NOTE — PHYSICAL EXAM
[de-identified] : NAD [de-identified] : Right volar hand incisions healing well, c/d/i with swelling as expected, no erythema,  limited ROM of 4th digit at PIP joint

## 2021-04-30 DIAGNOSIS — E78.5 HYPERLIPIDEMIA, UNSPECIFIED: ICD-10-CM

## 2021-04-30 DIAGNOSIS — Z12.11 ENCOUNTER FOR SCREENING FOR MALIGNANT NEOPLASM OF COLON: ICD-10-CM

## 2021-04-30 DIAGNOSIS — Z90.710 ACQUIRED ABSENCE OF BOTH CERVIX AND UTERUS: ICD-10-CM

## 2021-04-30 DIAGNOSIS — Z79.82 LONG TERM (CURRENT) USE OF ASPIRIN: ICD-10-CM

## 2021-04-30 DIAGNOSIS — Z79.84 LONG TERM (CURRENT) USE OF ORAL HYPOGLYCEMIC DRUGS: ICD-10-CM

## 2021-04-30 DIAGNOSIS — I10 ESSENTIAL (PRIMARY) HYPERTENSION: ICD-10-CM

## 2021-04-30 DIAGNOSIS — E11.9 TYPE 2 DIABETES MELLITUS WITHOUT COMPLICATIONS: ICD-10-CM

## 2021-04-30 DIAGNOSIS — K64.8 OTHER HEMORRHOIDS: ICD-10-CM

## 2021-04-30 DIAGNOSIS — M35.3 POLYMYALGIA RHEUMATICA: ICD-10-CM

## 2021-10-10 ENCOUNTER — EMERGENCY (EMERGENCY)
Facility: HOSPITAL | Age: 66
LOS: 0 days | Discharge: HOME | End: 2021-10-10
Attending: EMERGENCY MEDICINE | Admitting: EMERGENCY MEDICINE
Payer: MEDICARE

## 2021-10-10 VITALS
TEMPERATURE: 99 F | SYSTOLIC BLOOD PRESSURE: 144 MMHG | RESPIRATION RATE: 18 BRPM | HEART RATE: 87 BPM | OXYGEN SATURATION: 99 % | WEIGHT: 171.96 LBS | DIASTOLIC BLOOD PRESSURE: 89 MMHG | HEIGHT: 66 IN

## 2021-10-10 DIAGNOSIS — Z98.890 OTHER SPECIFIED POSTPROCEDURAL STATES: Chronic | ICD-10-CM

## 2021-10-10 DIAGNOSIS — R20.0 ANESTHESIA OF SKIN: ICD-10-CM

## 2021-10-10 DIAGNOSIS — Z90.710 ACQUIRED ABSENCE OF BOTH CERVIX AND UTERUS: Chronic | ICD-10-CM

## 2021-10-10 DIAGNOSIS — Z79.84 LONG TERM (CURRENT) USE OF ORAL HYPOGLYCEMIC DRUGS: ICD-10-CM

## 2021-10-10 DIAGNOSIS — E11.9 TYPE 2 DIABETES MELLITUS WITHOUT COMPLICATIONS: ICD-10-CM

## 2021-10-10 DIAGNOSIS — G56.02 CARPAL TUNNEL SYNDROME, LEFT UPPER LIMB: ICD-10-CM

## 2021-10-10 PROCEDURE — 99283 EMERGENCY DEPT VISIT LOW MDM: CPT

## 2021-10-10 RX ORDER — MELOXICAM 15 MG/1
1 TABLET ORAL
Qty: 10 | Refills: 0
Start: 2021-10-10 | End: 2021-10-19

## 2021-10-10 NOTE — ED ADULT NURSE NOTE - OBJECTIVE STATEMENT
Patient presents with c/o L wrist pain x1 day. Wrist warm, dry, tender to touch. No redness, bruising, or deformity noted. Denies trauma or fall. Denies fever, chills, headache, nausea, vomiting, diarrhea. Patient presents with c/o L wrist pain x1 day. Wrist warm, dry, tender to touch. No redness, bruising, or deformity noted. Denies trauma or fall.

## 2021-10-10 NOTE — ED PROVIDER NOTE - PATIENT PORTAL LINK FT
You can access the FollowMyHealth Patient Portal offered by [facility] by registering at the following website: [email or web address]. By joining [facility]’s FollowMyHealth portal, you will also be able to view your health information using other applications (apps) compatible with our system.

## 2021-10-10 NOTE — ED PROVIDER NOTE - ATTENDING CONTRIBUTION TO CARE
67 y/o female with hx of carpal tunnel syndrome and DM who presents with L hand numbness. Reports that the symptom started 2 days ago after she woke up in the morning. sx's not improved with nsaids. no trauma. agree with above exam    impression carpal tunnel flair, splint, dc home with ortho f/u

## 2021-10-10 NOTE — ED PROVIDER NOTE - PHYSICAL EXAMINATION
CONSTITUTIONAL: Well-appearing; well-nourished; in no apparent distress.   HEAD: Normocephalic; atraumatic.   NECK: Neck is supple without adenopathy. Trachea is midline.   RESPIRATORY: Chest wall is symmetric without deformity. No signs of respiratory distress. Lung sounds are clear in all lobes bilaterally without rales, rhonchi, or wheezes.  CARDIOVASCULAR: Regular rate and rhythm. Normal peripheral perfusion.   EXT: + tenderness to palpation on L wrist and L palm with no erythema or swelling; decreased ROM due to the pain/numbness. Distal pulses are normal. Sensation to the upper and lower extremities is normal bilaterally. Steady gait noted.  SKIN: Skin is warm, dry and intact without apparent rashes or lesions.

## 2021-10-10 NOTE — ED PROVIDER NOTE - OBJECTIVE STATEMENT
Pt is a 65 y/o female with hx of carpal tunnel syndrome and DM who presents with L hand numbness. Reports that the symptom started 2 days ago after she woke up in the morning. Reports that numbness has been intermittent with no specific pattern, there is no alleviating or worsening factor. Reports that she has been taking NSAIDS for the symptoms, but it has not help much. Denies recent trauma or injury to the hand. Reports that she has an appointment with her ortho in 10 days. Denies other associated symptoms.

## 2021-10-10 NOTE — ED PROVIDER NOTE - NS ED ROS FT
Constitutional: Negative for fever, chills, weight change, and fatigue.  Cardiovascular: Negative for chest pain, palpitation, and orthopnea.  Respiratory: Negative for SOB, wheezing, cough and sputum production.  Gastrointestinal: Negative for nausea, vomiting, abdominal pain, constipation, diarrhea, hematochezia, and melena.  Genitourinary: Negative for flank pain, dysuria, urgency, frequency, hematuria, and urinary retention.  Neurological: Negative for dizziness, syncope, focal weakness, numbness, and loss of consciousness.  Musculoskeletal: +L hand numbness. Negative for joint swelling, arthralgias, back pain, neck pain, and calf cramps.

## 2021-10-10 NOTE — ED PROVIDER NOTE - CARE PROVIDER_API CALL
Physical Therapy- 4EF  Visit Type: initial evaluation  Precautions:  Medical precautions:  fall risk; contact precautions and droplet precautions.  COVID-19  Hx multiple falls  Lines:     Basic: urinary catheter and capped IV  Safety Measures: sitter      SUBJECTIVE                                                                                                            Patient agreed to participate in therapy this date.  Collaborated with SHADI Soriano who reports pt family would like to take him home and do home therapy if possible.  Chart reviewed.  Pt reports to writer \"I like more!\"  Patient / Family Goal: maximize function and return home      OBJECTIVE                                                                                                                Oriented to person     Disoriented to situation    Affect/Behavior: calm, cooperative and confused  Strength (out of 5 unless otherwise indicated)   Comments / Details:  Requires some AAROM for full LE movements due to decreased strength and confusion  Balance    Sitting: Static: moderate assist, Dynamic: moderate assist  Bed Mobility:    Rolling left: minimal assist    Rolling right: minimal assist    Repositioning in bed: total assist - dependent    Supine to sit: moderate assist    Sit to supine: moderate assist  Training completed:      Mod (A) for supine to sit for decreased strength, balance, posterior lean, requires cues for sequencing due to confusion. Min (A) for sit to supine and rolling (B).  Pt tolerates sitting x 8 minutes with initial mod (A) for posterior trunk lean, advancing to mostly min (A) and brief supervision periods.    Transfers:    Training completed:      Pt declines        Interventions                                                                                                       Neuromuscular Re-Education: Sitting balance with Mod (A) progressing to miN (A)  For balance due to posterior and (L) leaning.  Facilitation  provided for weight shifting and to maintain neutral.  Pt performs (B) LE knee extension reps, reaching laterally,fwd, and diagonally across midline  Skilled input: Verbal instruction/cues, tactile instruction/cues, posture correction and facilitation  Verbal Consent: Writer verbally educated and received verbal consent for hand placement, positioning of patient, and techniques to be performed today from patient for clothing adjustments for techniques, hand placement and palpation for techniques and therapist position for techniques as described above and how they are pertinent to the patient's plan of care.        ASSESSMENT                                                                                                                Impairments: strength, activity tolerance, shortness of breath, balance deficits, cognition, safety awareness and endurance  Functional Limitations: all functional mobility  Pt seen for PT Initial Evaluation during hospital stay for multiple falls, COVID-19, hx of dementia.  Pt currently requires mod (A) for bed mobility and mod (A) for sitting balance at EOB due to impaired midline orientation with posterior lean, decreased strength, and for safety.  He declines to participate in transfers at this time, anticipate he may need total (A) due to level of assist required for sitting balance.  Per RN, pt family reports they would like to take patient home.  If they are able to provide total (A), this may be possible.  Otherwise recommend post-acute rehab.  Pt would benefit from continued skilled PT services to progress towards a higher level of independence with all functional mobility.     Discharge Recommendations  Recommendation for Discharge: PT WI: Post acute therapy, Home, 24 Hour assist, Home therapy(pending progress and how much assist family able to provide)         PT/OT Mobility Equipment for Discharge: to be assessed         Discharge Recommendations  Recommendation for  Discharge: PT WI: Post acute therapy, Home, 24 Hour assist, Home therapy(pending progress and how much assist family able to provide)         PT/OT Mobility Equipment for Discharge: to be assessed              Skilled therapy is required to address these limitations in attempt to maximize the patient's independence.  Predicted patient presentation: Moderate (evolving) - Patient comorbidities and complexities, as defined above, may have varying impact on steady progress for prescribed plan of care.    End of Session:   Location: in bed  Safety measures: sitter present and lines intact  Handoff to: nurse and nurse assistant            PLAN                                                                                                                            Suggestions for next session as indicated: Bring second person and matt Deleon, progress bed mobility, sitting balance, trial standin in [place] vs WW, core and LE strengthening, contact family for PLF    Frequency Comments: M-F      Interventions: balance, body mechanics, compensatory technique education, energy conservation, gait training, neuromuscular re-education, strengthening, bed mobility, cognitive reorientation (or training), continued evaluation, equipment eval/education, HEP train/position, patient/family training, safety education, functional transfer training and endurance training  Agreement to plan and goals: patient agrees with goals and treatment plan and will attempt to contact family/sig other/caretaker        GOALS:  Review Date: 11/23/2020  Long Term Goals: (to be met by time of discharge from hospital)  Sit to supine: Patient will complete sit to supine supervision.  Supine to sit: Patient will complete supine to sit supervision.  Sit to stand: Patient will complete sit to stand transfer with supervision.   Stand pivot: Patient will complete stand pivot transfer with supervision.   Ambulation (even): Patient will ambulate on even surface for  50 feet with supervision.     Documented in the chart in the following areas: Prior Level of Function. Assessment. Patient Education.      Admitting diagnosis: Hyponatremia (E87.1);Generalized weakness (R53.1);Fever and chills (R50.9);Multiple falls (R29.6);Dementia without behavioral disturbance, unspecified dementia type (CMS/[facility]) (F03.90);Fatigue, unspecified type (R53.83);COVID-19 virus infection (U07.1);Pneumonia due to COVID-19 virus (U07.1, J12.89);Suspected COVID-19 virus infection (Z20.828)    Co-morbidities and problem list:   Patient Active Problem List:   Hyperlipidemia   DM (diabetes mellitus) (CMS/[facility])   CAD (coronary artery disease)   HTN (hypertension)   MCI (mild cognitive impairment)   DJD of shoulder   Hammer toe   Acquired hypothyroidism   Aspiration pneumonia (CMS/[facility])      The referring provider's electronic signature on the evaluation authorizes the therapy plan of care and certifies the need for these services, furnished under this plan of care while under their care.         Jc Lim)  Plastic Surgery; Surgery of the Hand  [address] [place] [address], [address]  [place] [address]  Phone: [phone]  Fax: [phone]  Scheduled Appointment: 10/20/2021

## 2021-10-13 ENCOUNTER — APPOINTMENT (OUTPATIENT)
Dept: PLASTIC SURGERY | Facility: CLINIC | Age: 66
End: 2021-10-13
Payer: MEDICARE

## 2021-10-13 PROCEDURE — 20550 NJX 1 TENDON SHEATH/LIGAMENT: CPT | Mod: RT

## 2021-10-13 PROCEDURE — 99212 OFFICE O/P EST SF 10 MIN: CPT | Mod: 25

## 2021-10-13 NOTE — ASSESSMENT
[FreeTextEntry1] : 66 yo F with multiple trigger fingers and BL severe CTS\par s/p R CTR 3/12/21, s/p L index and third TFR (many years ago), and R 2nd and 3rd TFR 8/2018\par Now with persistent 4th TF s/p 5mg kenalog x1 and severe L hand pain\par \par -Office procedure for L CTR\par -5mg kenalog injected R 4th digit (2nd injection)\par \par Regarding the procedure, we discussed scarring, poor wound healing, bleeding, infection, need for additional surgery, and dissatisfaction with the outcome. Also discussed possibility of keloid and/or hypertrophic scar formation as well as recurrence. All questions were answered and risks understood.\par \par Due to COVID-19, pre-visit patient instructions were explained to the patient and their symptoms were checked upon arrival. Masks were used by the healthcare provider and staff and the examination room was cleaned after the patient visit concluded\par

## 2021-10-13 NOTE — HISTORY OF PRESENT ILLNESS
[FreeTextEntry1] : 66 yo F with h/o left index and middle trigger finger release doing well who presents with recurrent triggering of the right index and middle finger. Patient is s/p Kenalog injection into right index x1 in 2016 and 3rd TF in 8/2016 and 6/2017 with recurrent triggering. \par \par Patient is now 4 months s/p release of right 2nd and 3rd trigger finger. Presents for follow up.\par \par Interval hx (9/22/20). Patient presents today for f/u 2 yrs s/p right index and middle TFR c/o right hand stiffness and pain of the 4th digit now for the past few months causing limited ROM. Denies any hand trauma. \par \par Interval hx (11/3/20). Patient presents today for f/u to discuss X-ray findings. \par \par Interval hx (12/15/21). Pt here to discuss EMG results. Test reveals severe bl CTS.\par \par Interval hx (3/26/21). Patient presents today POD#14 s/p right CTR. Doing well and reporting improvement in finger numbness but concerned with limited ROM of right 4th digit. Denies any f/c or bleeding. \par \par Interval hx (10/13/21). Patient presents today 7 months s/p right CTR. C/o worsening pain and numbness to left hand. She states it was so severe over the weekend that she went to the ED and was given a volar splint which has been helpful. Also with persistent right 4th triggering s/p 1 injection in the past.

## 2021-10-21 PROCEDURE — 33533 CABG ARTERIAL SINGLE: CPT | Mod: 80

## 2021-10-21 PROCEDURE — 33518 CABG ARTERY-VEIN TWO: CPT | Mod: 80

## 2021-10-21 PROCEDURE — 33508 ENDOSCOPIC VEIN HARVEST: CPT | Mod: 80,59

## 2021-11-12 ENCOUNTER — APPOINTMENT (OUTPATIENT)
Dept: PLASTIC SURGERY | Facility: CLINIC | Age: 66
End: 2021-11-12
Payer: MEDICARE

## 2021-11-12 PROCEDURE — 64721 CARPAL TUNNEL SURGERY: CPT | Mod: LT

## 2021-11-12 NOTE — PROCEDURE
[FreeTextEntry6] : Patient is a 66 year old female with left carpal tunnel syndrome.  \par \par Local anesthetic was administered using 1% lidocaine with epinephrine.  Area was prepped and draped in usual fashion after an appropriate dwell period for the local to have its intended effect.  Skin incision was made with a scalpel.  Superficial palmar fascia was divided and the transverse carpal ligament was visualized.  Motor branch of median nerve was not seen.  The TCL was divided.  The median nerve was visible and showed signs of chronic compression.  Hemostasis was confirmed and the area was irrigated copiously.  4-0 Nylon sutures were used for closure.  Area cleansed with normal saline.  Sterile dressing applied along with a compression wrap and a soft splint (4x4 gauze, kerlix, abdominal pad, Coban).\par \par Patient tolerated procedure well and understands post-op instructions.\par \par \par Due to COVID 19, pre-visit patient instructions were explained to the patient and their symptoms were checked upon arrival.  \par Masks were used by the health care providers and staff and the examination room was cleaned after the patient visit was completed.\par

## 2021-11-24 ENCOUNTER — APPOINTMENT (OUTPATIENT)
Dept: PLASTIC SURGERY | Facility: CLINIC | Age: 66
End: 2021-11-24
Payer: MEDICARE

## 2021-11-24 PROCEDURE — 99024 POSTOP FOLLOW-UP VISIT: CPT

## 2021-11-24 NOTE — ASSESSMENT
[FreeTextEntry1] : 64 yo F with multiple trigger fingers and BL severe CTS\par s/p R CTR 3/12/21, s/p L index and third TFR (many years ago), and R 2nd and 3rd TFR 8/2018\par R 4th TF s/p 5mg kenalog x2\par POD #12 s/p L CTR\par \par \par \par Due to COVID-19, pre-visit patient instructions were explained to the patient and their symptoms were checked upon arrival. Masks were used by the healthcare provider and staff and the examination room was cleaned after the patient visit concluded\par

## 2021-11-24 NOTE — PHYSICAL EXAM
[de-identified] : NAD [de-identified] : Right volar hand incisions well-healed, FROM except 4th digit with limited extension and pain over A1 pulley, SILT\par Left hand with diminished sensation to median distribution, + Tinels and Phalens with thenar atrophy

## 2021-11-24 NOTE — HISTORY OF PRESENT ILLNESS
[FreeTextEntry1] : 66 yo F with h/o left index and middle trigger finger release doing well who presents with recurrent triggering of the right index and middle finger. Patient is s/p Kenalog injection into right index x1 in 2016 and 3rd TF in 8/2016 and 6/2017 with recurrent triggering. \par \par Patient is now 4 months s/p release of right 2nd and 3rd trigger finger. Presents for follow up.\par \par Interval hx (9/22/20). Patient presents today for f/u 2 yrs s/p right index and middle TFR c/o right hand stiffness and pain of the 4th digit now for the past few months causing limited ROM. Denies any hand trauma. \par \par Interval hx (11/3/20). Patient presents today for f/u to discuss X-ray findings. \par \par Interval hx (12/15/21). Pt here to discuss EMG results. Test reveals severe bl CTS.\par \par Interval hx (3/26/21). Patient presents today POD#14 s/p right CTR. Doing well and reporting improvement in finger numbness but concerned with limited ROM of right 4th digit. Denies any f/c or bleeding. \par \par Interval hx (10/13/21). Patient presents today 7 months s/p right CTR. C/o worsening pain and numbness to left hand. She states it was so severe over the weekend that she went to the ED and was given a volar splint which has been helpful. Also with persistent right 4th triggering s/p 1 injection in the past.\par \par Interval hx (11/24/21): Pt presents today POD #12 s/p L CTR. Denies signficant pain, f/c, or drainage. Compliant with hand elevation.

## 2021-12-31 NOTE — ASU PATIENT PROFILE, ADULT - TEACHING/LEARNING FACTORS INFLUENCE READINESS TO LEARN
"Follow up call to Mike today to discuss his HR, lightheadedness. He reduced Metoprolol Succinate dose to 12.5mg daily on 12/27/21.   He reports that his lightheadedness went away later that day, and he has since had HR at 60 or higher with good activity tolerance.    Mike feels that there may be some \"connection to weather changes\" that correlate with his symptoms.  \"For years, I have noticed that whenever a weather pattern is occurring that my body senses it even up to 10-12 hours before it arrives\"  \"sounds strange, but I wonder if there are any connections\".    Mike has f/u with PCP 1/10/22 and has back surgery on 1/13/22      FYI to Dr. Patrice Pineda RN BSN, [facility]      " none

## 2022-01-06 ENCOUNTER — APPOINTMENT (OUTPATIENT)
Dept: PLASTIC SURGERY | Facility: CLINIC | Age: 67
End: 2022-01-06
Payer: MEDICARE

## 2022-01-06 PROCEDURE — 99024 POSTOP FOLLOW-UP VISIT: CPT

## 2022-01-06 NOTE — ASSESSMENT
[FreeTextEntry1] : 66 yo F with multiple trigger fingers and BL severe CTS\par s/p R CTR 3/12/21, s/p L index and third TFR (many years ago), and R 2nd and 3rd TFR 8/2018\par R 4th TF s/p 5mg kenalog x2\par POD #12 s/p L CTR\par \par \par \par Due to COVID-19, pre-visit patient instructions were explained to the patient and their symptoms were checked upon arrival. Masks were used by the healthcare provider and staff and the examination room was cleaned after the patient visit concluded\par \par 1/6/2022\par  as above\par doing very well\par no issues\par \par pt happy\par \par f/u prn

## 2022-01-06 NOTE — HISTORY OF PRESENT ILLNESS
[FreeTextEntry1] : 64 yo F with h/o left index and middle trigger finger release doing well who presents with recurrent triggering of the right index and middle finger. Patient is s/p Kenalog injection into right index x1 in 2016 and 3rd TF in 8/2016 and 6/2017 with recurrent triggering. \par \par Patient is now 4 months s/p release of right 2nd and 3rd trigger finger. Presents for follow up.\par \par Interval hx (9/22/20). Patient presents today for f/u 2 yrs s/p right index and middle TFR c/o right hand stiffness and pain of the 4th digit now for the past few months causing limited ROM. Denies any hand trauma. \par \par Interval hx (11/3/20). Patient presents today for f/u to discuss X-ray findings. \par \par Interval hx (12/15/21). Pt here to discuss EMG results. Test reveals severe bl CTS.\par \par Interval hx (3/26/21). Patient presents today POD#14 s/p right CTR. Doing well and reporting improvement in finger numbness but concerned with limited ROM of right 4th digit. Denies any f/c or bleeding. \par \par Interval hx (10/13/21). Patient presents today 7 months s/p right CTR. C/o worsening pain and numbness to left hand. She states it was so severe over the weekend that she went to the ED and was given a volar splint which has been helpful. Also with persistent right 4th triggering s/p 1 injection in the past.\par \par Interval hx (11/24/21): Pt presents today POD #12 s/p L CTR. Denies signficant pain, f/c, or drainage. Compliant with hand elevation.

## 2022-01-06 NOTE — PHYSICAL EXAM
[de-identified] : NAD [de-identified] : Right volar hand incisions well-healed, FROM except 4th digit with limited extension and pain over A1 pulley, SILT\par Left hand with diminished sensation to median distribution, + Tinels and Phalens with thenar atrophy

## 2022-02-17 ENCOUNTER — TRANSCRIPTION ENCOUNTER (OUTPATIENT)
Age: 67
End: 2022-02-17

## 2022-02-17 ENCOUNTER — APPOINTMENT (OUTPATIENT)
Dept: PLASTIC SURGERY | Facility: CLINIC | Age: 67
End: 2022-02-17
Payer: MEDICARE

## 2022-02-17 DIAGNOSIS — G56.00 CARPAL TUNNEL SYNDROME, UNSPECIFIED UPPER LIMB: ICD-10-CM

## 2022-02-17 PROCEDURE — 99212 OFFICE O/P EST SF 10 MIN: CPT | Mod: 25

## 2022-02-17 PROCEDURE — 20550 NJX 1 TENDON SHEATH/LIGAMENT: CPT | Mod: RT

## 2022-02-17 RX ORDER — METHYLPREDNISOLONE 4 MG/1
4 TABLET ORAL
Qty: 1 | Refills: 3 | Status: ACTIVE | COMMUNITY
Start: 2022-02-17 | End: 1900-01-01

## 2022-02-17 NOTE — HISTORY OF PRESENT ILLNESS
[FreeTextEntry1] : 66 yo F with h/o left index and middle trigger finger release doing well who presents with recurrent triggering of the right index and middle finger. Patient is s/p Kenalog injection into right index x1 in 2016 and 3rd TF in 8/2016 and 6/2017 with recurrent triggering. \par \par Patient is now 4 months s/p release of right 2nd and 3rd trigger finger. Presents for follow up.\par \par Interval hx (9/22/20). Patient presents today for f/u 2 yrs s/p right index and middle TFR c/o right hand stiffness and pain of the 4th digit now for the past few months causing limited ROM. Denies any hand trauma. \par \par Interval hx (11/3/20). Patient presents today for f/u to discuss X-ray findings. \par \par Interval hx (12/15/21). Pt here to discuss EMG results. Test reveals severe bl CTS.\par \par Interval hx (3/26/21). Patient presents today POD#14 s/p right CTR. Doing well and reporting improvement in finger numbness but concerned with limited ROM of right 4th digit. Denies any f/c or bleeding. \par \par Interval hx (10/13/21). Patient presents today 7 months s/p right CTR. C/o worsening pain and numbness to left hand. She states it was so severe over the weekend that she went to the ED and was given a volar splint which has been helpful. Also with persistent right 4th triggering s/p 1 injection in the past.\par \par Interval hx (11/24/21): Pt presents today POD #12 s/p L CTR. Denies signficant pain, f/c, or drainage. Compliant with hand elevation.\par \par INterval hx (2/17/22). Patient presents today 3.5 months s/p left CTR. Doing well.

## 2022-02-17 NOTE — PHYSICAL EXAM
[de-identified] : NAD [de-identified] : Right volar hand incisions well-healed, FROM except 4th digit with limited extension and pain over A1 pulley, SILT\par Left hand incision healed

## 2022-02-17 NOTE — ASSESSMENT
[FreeTextEntry1] : 65 yo F with multiple trigger fingers and BL severe CTS\par s/p R CTR 3/12/21, s/p L index and third TFR (many years ago), and R 2nd and 3rd TFR 8/2018\par R 4th TF s/p 5mg kenalog x2\par \par Now 3.5 months s/p L CTR. \par \par \par \par Due to COVID-19, pre-visit patient instructions were explained to the patient and their symptoms were checked upon arrival. Masks were used by the healthcare provider and staff and the examination room was cleaned after the patient visit concluded\par \par 2/17/2022\par as above\par left hand fine\par right hand w dorsoradial wrist tenderness\par imp right dequervains\par sugegsted and injected 5mg kenalog into rigth radial styloid\par medrol dose pack\par soft thumb spica\par \par f/u 6 wks\par \par tolerated injection fine\par \par Due to COVID 19, pre-visit patient instructions were explained to the patient and their symptoms were checked upon arrival.  \par Masks were used by the health care providers and staff and the examination room was cleaned after the patient visit was completed.\par

## 2022-03-03 ENCOUNTER — APPOINTMENT (OUTPATIENT)
Dept: PLASTIC SURGERY | Facility: CLINIC | Age: 67
End: 2022-03-03
Payer: MEDICARE

## 2022-03-03 DIAGNOSIS — M65.4 RADIAL STYLOID TENOSYNOVITIS [DE QUERVAIN]: ICD-10-CM

## 2022-03-03 PROCEDURE — 99212 OFFICE O/P EST SF 10 MIN: CPT

## 2022-03-03 NOTE — HISTORY OF PRESENT ILLNESS
[FreeTextEntry1] : 66 yo F with h/o left index and middle trigger finger release doing well who presents with recurrent triggering of the right index and middle finger. Patient is s/p Kenalog injection into right index x1 in 2016 and 3rd TF in 8/2016 and 6/2017 with recurrent triggering. \par \par Patient is now 4 months s/p release of right 2nd and 3rd trigger finger. Presents for follow up.\par \par Interval hx (9/22/20). Patient presents today for f/u 2 yrs s/p right index and middle TFR c/o right hand stiffness and pain of the 4th digit now for the past few months causing limited ROM. Denies any hand trauma. \par \par Interval hx (11/3/20). Patient presents today for f/u to discuss X-ray findings. \par \par Interval hx (12/15/21). Pt here to discuss EMG results. Test reveals severe bl CTS.\par \par Interval hx (3/26/21). Patient presents today POD#14 s/p right CTR. Doing well and reporting improvement in finger numbness but concerned with limited ROM of right 4th digit. Denies any f/c or bleeding. \par \par Interval hx (10/13/21). Patient presents today 7 months s/p right CTR. C/o worsening pain and numbness to left hand. She states it was so severe over the weekend that she went to the ED and was given a volar splint which has been helpful. Also with persistent right 4th triggering s/p 1 injection in the past.\par \par Interval hx (11/24/21): Pt presents today POD #12 s/p L CTR. Denies signficant pain, f/c, or drainage. Compliant with hand elevation.\par \par INterval hx (2/17/22). Patient presents today 3.5 months s/p left CTR. Doing well. \par \par Interval hx (3/3/22). Pt presents for f/u for right hand DQ s/p Kenalog injection and course of Medrol last visit. Doing well with significant improvement in pain and discomfort. Compliant with soft spica splint.

## 2022-03-03 NOTE — ASSESSMENT
[FreeTextEntry1] : 65 yo F with multiple trigger fingers and BL severe CTS\par s/p R CTR 3/12/21, s/p L index and third TFR (many years ago), and R 2nd and 3rd TFR 8/2018\par R 4th TF s/p 5mg kenalog x2\par s/p L CTR. \par \par Now with right DQ s/p Kenalog injection and Medrol dose pack with improvement of symptoms. \par \par - continue soft spica splint\par - f/u PRN \par \par Due to COVID-19, pre-visit patient instructions were explained to the patient and their symptoms were checked upon arrival. Masks were used by the healthcare provider and staff and the examination room was cleaned after the patient visit concluded\par \par 3/3/2022\par as above\par right radial styloid pain gone\par doing very well\par no issues\par \par Due to COVID 19, pre-visit patient instructions were explained to the patient and their symptoms were checked upon arrival.  \par Masks were used by the health care providers and staff and the examination room was cleaned after the patient visit was completed.\par \par \par \par

## 2022-03-03 NOTE — PHYSICAL EXAM
[de-identified] : NAD [de-identified] : Right volar hand incisions well-healed, radial styloid with slight tenderness, significantly improved from last visit \par Left hand incision healed

## 2022-03-10 ENCOUNTER — APPOINTMENT (OUTPATIENT)
Dept: UROLOGY | Facility: CLINIC | Age: 67
End: 2022-03-10

## 2022-03-14 ENCOUNTER — APPOINTMENT (OUTPATIENT)
Dept: UROLOGY | Facility: CLINIC | Age: 67
End: 2022-03-14

## 2022-08-30 ENCOUNTER — APPOINTMENT (OUTPATIENT)
Dept: PLASTIC SURGERY | Facility: CLINIC | Age: 67
End: 2022-08-30

## 2022-08-30 PROCEDURE — 20550 NJX 1 TENDON SHEATH/LIGAMENT: CPT

## 2022-08-30 PROCEDURE — 99212 OFFICE O/P EST SF 10 MIN: CPT | Mod: 25

## 2022-08-30 RX ORDER — METHYLPREDNISOLONE 4 MG/1
4 TABLET ORAL
Qty: 3 | Refills: 2 | Status: ACTIVE | COMMUNITY
Start: 2022-08-30 | End: 1900-01-01

## 2022-08-30 NOTE — ASSESSMENT
[FreeTextEntry1] : 65 yo F with multiple trigger fingers and BL severe CTS\par s/p R CTR 3/12/21, s/p L index and third TFR (many years ago), and R 2nd and 3rd TFR 8/2018\par R 4th TF s/p 5mg kenalog x2\par s/p L CTR. \par \par Now with right DQ s/p Kenalog injection and Medrol dose pack with improvement of symptoms. \par \par - continue soft spica splint\par - f/u PRN \par \par Due to COVID-19, pre-visit patient instructions were explained to the patient and their symptoms were checked upon arrival. Masks were used by the healthcare provider and staff and the examination room was cleaned after the patient visit concluded\par \par 3/3/2022\par as above\par right radial styloid pain gone\par doing very well\par no issues\par \par Due to COVID 19, pre-visit patient instructions were explained to the patient and their symptoms were checked upon arrival.  \par Masks were used by the health care providers and staff and the examination room was cleaned after the patient visit was completed.\par \par \par 8/30/2022\par current issues:\par diffciulty extending 3+4th finger due to pain at MPJ\par tenderness over right radial styloid\par \par \par injected 5mg kenalog into right radial styloid\par xrays\par \par .toleraedt well\par \par Due to COVID 19, pre-visit patient instructions were explained to the patient and their symptoms were checked upon arrival.  \par Masks were used by the health care providers and staff and the examination room was cleaned after the patient visit was completed.\par \par medrol dose\par \par \par

## 2022-08-30 NOTE — HISTORY OF PRESENT ILLNESS
[FreeTextEntry1] : 64 yo F with h/o left index and middle trigger finger release doing well who presents with recurrent triggering of the right index and middle finger. Patient is s/p Kenalog injection into right index x1 in 2016 and 3rd TF in 8/2016 and 6/2017 with recurrent triggering. \par \par Patient is now 4 months s/p release of right 2nd and 3rd trigger finger. Presents for follow up.\par \par Interval hx (9/22/20). Patient presents today for f/u 2 yrs s/p right index and middle TFR c/o right hand stiffness and pain of the 4th digit now for the past few months causing limited ROM. Denies any hand trauma. \par \par Interval hx (11/3/20). Patient presents today for f/u to discuss X-ray findings. \par \par Interval hx (12/15/21). Pt here to discuss EMG results. Test reveals severe bl CTS.\par \par Interval hx (3/26/21). Patient presents today POD#14 s/p right CTR. Doing well and reporting improvement in finger numbness but concerned with limited ROM of right 4th digit. Denies any f/c or bleeding. \par \par Interval hx (10/13/21). Patient presents today 7 months s/p right CTR. C/o worsening pain and numbness to left hand. She states it was so severe over the weekend that she went to the ED and was given a volar splint which has been helpful. Also with persistent right 4th triggering s/p 1 injection in the past.\par \par Interval hx (11/24/21): Pt presents today POD #12 s/p L CTR. Denies signficant pain, f/c, or drainage. Compliant with hand elevation.\par \par INterval hx (2/17/22). Patient presents today 3.5 months s/p left CTR. Doing well. \par \par Interval hx (3/3/22). Pt presents for f/u for right hand DQ s/p Kenalog injection and course of Medrol last visit. Doing well with significant improvement in pain and discomfort. Compliant with soft spica splint.

## 2022-08-30 NOTE — PHYSICAL EXAM
[de-identified] : NAD [de-identified] : Right volar hand incisions well-healed, radial styloid with slight tenderness, significantly improved from last visit \par Left hand incision healed

## 2022-10-04 ENCOUNTER — APPOINTMENT (OUTPATIENT)
Dept: PLASTIC SURGERY | Facility: CLINIC | Age: 67
End: 2022-10-04

## 2022-10-04 PROCEDURE — 99212 OFFICE O/P EST SF 10 MIN: CPT

## 2022-10-04 NOTE — HISTORY OF PRESENT ILLNESS
[FreeTextEntry1] : 64 yo F with h/o left index and middle trigger finger release doing well who presents with recurrent triggering of the right index and middle finger. Patient is s/p Kenalog injection into right index x1 in 2016 and 3rd TF in 8/2016 and 6/2017 with recurrent triggering. \par \par Patient is now 4 months s/p release of right 2nd and 3rd trigger finger. Presents for follow up.\par \par Interval hx (9/22/20). Patient presents today for f/u 2 yrs s/p right index and middle TFR c/o right hand stiffness and pain of the 4th digit now for the past few months causing limited ROM. Denies any hand trauma. \par \par Interval hx (11/3/20). Patient presents today for f/u to discuss X-ray findings. \par \par Interval hx (12/15/21). Pt here to discuss EMG results. Test reveals severe bl CTS.\par \par Interval hx (3/26/21). Patient presents today POD#14 s/p right CTR. Doing well and reporting improvement in finger numbness but concerned with limited ROM of right 4th digit. Denies any f/c or bleeding. \par \par Interval hx (10/13/21). Patient presents today 7 months s/p right CTR. C/o worsening pain and numbness to left hand. She states it was so severe over the weekend that she went to the ED and was given a volar splint which has been helpful. Also with persistent right 4th triggering s/p 1 injection in the past.\par \par Interval hx (11/24/21): Pt presents today POD #12 s/p L CTR. Denies signficant pain, f/c, or drainage. Compliant with hand elevation.\par \par INterval hx (2/17/22). Patient presents today 3.5 months s/p left CTR. Doing well. \par \par Interval hx (3/3/22). Pt presents for f/u for right hand DQ s/p Kenalog injection and course of Medrol last visit. Doing well with significant improvement in pain and discomfort. Compliant with soft spica splint. \par \par Interval hx (10/4/22). Pt here for f/u and review of hand X-Ray.

## 2022-10-04 NOTE — ASSESSMENT
[FreeTextEntry1] : 66 yo F with multiple trigger fingers and BL severe CTS\par s/p R CTR 3/12/21, s/p L index and third TFR (many years ago), and R 2nd and 3rd TFR 8/2018\par R 4th TF s/p 5mg kenalog x2\par s/p L CTR. \par \par Now with right DQ s/p Kenalog injection x2 and Medrol dose pack with improvement of symptoms. \par \par -\par \par \par Due to COVID-19, pre-visit patient instructions were explained to the patient and their symptoms were checked upon arrival. Masks were used by the healthcare provider and staff and the examination room was cleaned after the patient visit concluded\par \par 10/4/2022\par xrays reviewed--B/L wrist erosions--report sent to Connie Mcfadden\par \par pt c/o right 4th A1 tenderness, rigth thumb A! tenderness, and elft 4th finger A1 tenderness\par \par suggested and injected 5mg kenalog into R1, R4, and L4 A1 pulleys\par \par tolerated well\par no issues\par \par encouraged w f/o w rheum, earlier than scheduled appt in Jan 2023\par \par Due to COVID 19, pre-visit patient instructions were explained to the patient and their symptoms were checked upon arrival.  \par Masks were used by the health care providers and staff and the examination room was cleaned after the patient visit was completed.\par \par \par \par \par \par \par

## 2022-10-04 NOTE — PHYSICAL EXAM
[de-identified] : NAD [de-identified] : Right volar hand incisions well-healed, radial styloid with slight tenderness, significantly improved from last visit \par Left hand incision healed

## 2022-11-15 ENCOUNTER — APPOINTMENT (OUTPATIENT)
Dept: PLASTIC SURGERY | Facility: CLINIC | Age: 67
End: 2022-11-15

## 2022-12-11 NOTE — ED PROVIDER NOTE - NS ED ROS FT
Constitutional: No fever, chills.  Eyes:  No visual changes, eye pain or discharge.  ENMT:  No hearing changes, pain, no sore throat or runny nose, no difficulty swallowing  Cardiac:  No chest pain, SOB or edema. No chest pain with exertion.  Respiratory:  No cough or respiratory distress. No hemoptysis. No history of asthma or RAD.  GI:  No fecal incontinence. No nausea, vomiting, diarrhea or abdominal pain.  : No urinary incontinence. No dysuria, frequency or burning.  MS:  Right sided lower back pain. No midline back pain. No saddle anesthesia.  Neuro:  No unilateral weakness or numbness. No bilateral weakness or numbness. No headache or weakness.  No LOC.  Skin:  No skin rash.   Endocrine: No history of thyroid disease or diabetes. Spine appears normal, range of motion is not limited, no muscle or joint tenderness

## 2024-03-20 ENCOUNTER — OUTPATIENT (OUTPATIENT)
Dept: OUTPATIENT SERVICES | Facility: HOSPITAL | Age: 69
LOS: 1 days | End: 2024-03-20
Payer: MEDICARE

## 2024-03-20 DIAGNOSIS — R07.9 CHEST PAIN, UNSPECIFIED: ICD-10-CM

## 2024-03-20 DIAGNOSIS — Z98.890 OTHER SPECIFIED POSTPROCEDURAL STATES: Chronic | ICD-10-CM

## 2024-03-20 DIAGNOSIS — Z00.8 ENCOUNTER FOR OTHER GENERAL EXAMINATION: ICD-10-CM

## 2024-03-20 DIAGNOSIS — Z90.710 ACQUIRED ABSENCE OF BOTH CERVIX AND UTERUS: Chronic | ICD-10-CM

## 2024-03-20 PROCEDURE — 75574 CT ANGIO HRT W/3D IMAGE: CPT | Mod: 26

## 2024-03-20 PROCEDURE — 75574 CT ANGIO HRT W/3D IMAGE: CPT

## 2024-03-21 DIAGNOSIS — R07.9 CHEST PAIN, UNSPECIFIED: ICD-10-CM

## 2024-05-21 ENCOUNTER — APPOINTMENT (OUTPATIENT)
Dept: PLASTIC SURGERY | Facility: CLINIC | Age: 69
End: 2024-05-21
Payer: MEDICARE

## 2024-05-21 PROCEDURE — 20550 NJX 1 TENDON SHEATH/LIGAMENT: CPT

## 2024-05-21 PROCEDURE — 99212 OFFICE O/P EST SF 10 MIN: CPT | Mod: 25

## 2024-05-21 NOTE — PHYSICAL EXAM
[de-identified] : NAD [de-identified] : Right volar hand incisions well-healed, radial styloid with slight tenderness, significantly improved from last visit \par  Left hand incision healed

## 2024-05-21 NOTE — ASSESSMENT
[FreeTextEntry1] : 66 yo F with multiple trigger fingers and BL severe CTS s/p R CTR 3/12/21, s/p L index and third TFR (many years ago), and R 2nd and 3rd TFR 8/2018 R 4th TF s/p 5mg kenalog x2 s/p L CTR.   Now with right DQ s/p Kenalog injection x2 and Medrol dose pack with improvement of symptoms.   -   Due to COVID-19, pre-visit patient instructions were explained to the patient and their symptoms were checked upon arrival. Masks were used by the healthcare provider and staff and the examination room was cleaned after the patient visit concluded  10/4/2022 xrays reviewed--B/L wrist erosions--report sent to Connie Mcfadden  pt c/o right 4th A1 tenderness, rigth thumb A! tenderness, and elft 4th finger A1 tenderness  suggested and injected 5mg kenalog into R1, R4, and L4 A1 pulleys  tolerated well no issues  encouraged w f/o w rheum, earlier than scheduled appt in Jan 2023  Due to COVID 19, pre-visit patient instructions were explained to the patient and their symptoms were checked upon arrival.   Masks were used by the health care providers and staff and the examination room was cleaned after the patient visit was completed.  5/21/2024 concerned w painful triggering R+L $+% worst is right hand (4 same as 5)  injected 5mg kenalog into right 5th, injected 5mg kenalog into right radial styloid (one prior injection)  office procedure for right 5th TF    Patient tolerated procedure well and understands post-op instructions.

## 2024-06-17 ENCOUNTER — NON-APPOINTMENT (OUTPATIENT)
Age: 69
End: 2024-06-17

## 2025-01-17 ENCOUNTER — NON-APPOINTMENT (OUTPATIENT)
Age: 70
End: 2025-01-17

## 2025-08-28 ENCOUNTER — NON-APPOINTMENT (OUTPATIENT)
Age: 70
End: 2025-08-28

## 2025-09-16 ENCOUNTER — APPOINTMENT (OUTPATIENT)
Dept: SURGERY | Facility: CLINIC | Age: 70
End: 2025-09-16
Payer: MEDICARE

## 2025-09-16 VITALS
WEIGHT: 168 LBS | OXYGEN SATURATION: 98 % | HEART RATE: 101 BPM | DIASTOLIC BLOOD PRESSURE: 90 MMHG | SYSTOLIC BLOOD PRESSURE: 150 MMHG | HEIGHT: 66 IN | BODY MASS INDEX: 27 KG/M2

## 2025-09-16 DIAGNOSIS — K86.2 CYST OF PANCREAS: ICD-10-CM

## 2025-09-16 PROCEDURE — 99205 OFFICE O/P NEW HI 60 MIN: CPT

## 2025-09-18 ENCOUNTER — NON-APPOINTMENT (OUTPATIENT)
Age: 70
End: 2025-09-18